# Patient Record
Sex: FEMALE | Race: OTHER
[De-identification: names, ages, dates, MRNs, and addresses within clinical notes are randomized per-mention and may not be internally consistent; named-entity substitution may affect disease eponyms.]

---

## 2019-06-24 ENCOUNTER — APPOINTMENT (OUTPATIENT)
Dept: ORTHOPEDIC SURGERY | Facility: CLINIC | Age: 84
End: 2019-06-24
Payer: MEDICARE

## 2019-06-24 VITALS
SYSTOLIC BLOOD PRESSURE: 150 MMHG | DIASTOLIC BLOOD PRESSURE: 88 MMHG | HEART RATE: 77 BPM | HEIGHT: 63 IN | TEMPERATURE: 98.2 F

## 2019-06-24 DIAGNOSIS — Z86.39 PERSONAL HISTORY OF OTHER ENDOCRINE, NUTRITIONAL AND METABOLIC DISEASE: ICD-10-CM

## 2019-06-24 DIAGNOSIS — Z78.9 OTHER SPECIFIED HEALTH STATUS: ICD-10-CM

## 2019-06-24 DIAGNOSIS — Z86.79 PERSONAL HISTORY OF OTHER DISEASES OF THE CIRCULATORY SYSTEM: ICD-10-CM

## 2019-06-24 DIAGNOSIS — Z87.09 PERSONAL HISTORY OF OTHER DISEASES OF THE RESPIRATORY SYSTEM: ICD-10-CM

## 2019-06-24 DIAGNOSIS — M19.012 PRIMARY OSTEOARTHRITIS, LEFT SHOULDER: ICD-10-CM

## 2019-06-24 PROCEDURE — 99203 OFFICE O/P NEW LOW 30 MIN: CPT | Mod: 25

## 2019-06-24 PROCEDURE — 73030 X-RAY EXAM OF SHOULDER: CPT | Mod: LT

## 2019-06-24 PROCEDURE — 20610 DRAIN/INJ JOINT/BURSA W/O US: CPT | Mod: LT

## 2019-06-27 NOTE — PROCEDURE
[de-identified] : Consent: \par At this time, I have recommended an injection to the left shoulder.  The risks and benefits of the procedure were discussed with the patient in detail.  Upon verbal consent of the patient, we proceeded with the injection as noted below.  \par \par Procedure:  \par After a sterile prep, the patient underwent an injection of 9 cc of 1% Lidocaine without epinephrine and 1 cc of Kenalog into the left shoulder.  The patient tolerated the procedure well.  There were no complications.  \par

## 2019-06-27 NOTE — PHYSICAL EXAM
[Normal] : Gait: normal [de-identified] : Right Shoulder:\par Range of Motion in Degrees:  	\par 	                                  Claimant:	Normal:	\par Abduction (Active)	                  180	               180 degrees	\par Abduction (Passive)	  180	               180 degrees	\par Forward elevation (Active):	  180	               180 degrees	\par Forward elevation (Passive):	  180	               180 degrees	\par External rotation (Active):	   45	               45 degrees	\par External rotation (Passive):	   45	               45 degrees	\par Internal rotation (Active):	   L-1	               L-1	\par Internal rotation (Passive):	   L-1	               L-1	\par \par Diffuse crepitations and tenderness throughout range of motion.  Pain and swelling throughout range of motion, more significant at extremes.  No motor weakness to internal rotation, external rotation or abduction in the scapular plane.  Negative crank test.  Negative O’Dru’s test.  Negative Speed’s test.  Negative Yergason’s test.  Negative cross arm test.  No tenderness to palpation at the AC joint. Negative Hawkin’s sign.  Negative Neer’s sign.  Negative apprehension. Negative sulcus sign.  No gross neurological or vascular deficits distally.  Skin is intact.  No rashes, scars or lesions.  2+ radial and ulnar pulses. No extra-articular swelling or tenderness.\par  \par Left Shoulder: \par Range of Motion in Degrees:  	\par 	                                  Claimant:	Normal:	\par Abduction (Active)	                   80	               180 degrees	\par Abduction (Passive)	   80	               180 degrees	\par Forward elevation (Active):	   80	               180 degrees	\par Forward elevation (Passive):	   80	               180 degrees	\par External rotation (Active):	   30	               45 degrees	\par External rotation (Passive):	   30	               45 degrees	\par Internal rotation (Active):	 To the side                  L-1	\par Internal rotation (Passive):	 To the side                  L-1	\par \par Diffuse crepitations and tenderness throughout range of motion.  Pain and swelling throughout range of motion, more significant at extremes.  No motor weakness to internal rotation, external rotation or abduction in the scapular plane.  Negative crank test.  Negative O’Dru’s test.  Negative Speed’s test.  Negative Yergason’s test.  Negative cross arm test.  No tenderness to palpation at the AC joint.  Positive Hawkin’s sign.  Positive Neer’s sign.  Negative apprehension. Negative sulcus sign.  No gross neurological or vascular deficits distally.  Skin is intact.  No rashes, scars or lesions.  2+ radial and ulnar pulses. 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

## 2019-06-27 NOTE — DISCUSSION/SUMMARY
[de-identified] : At this time, due to osteoarthritis of left shoulder, I recommended ice and elevation.  She will be reassessed in three to four weeks.  We will start her on therapy and we will discuss the potential for viscosupplementation.

## 2019-06-27 NOTE — CONSULT LETTER
[Consult Letter:] : I had the pleasure of evaluating your patient, [unfilled]. [Dear  ___] : Dear  [unfilled], [Consult Closing:] : Thank you very much for allowing me to participate in the care of this patient.  If you have any questions, please do not hesitate to contact me. [Please see my note below.] : Please see my note below. [Sincerely,] : Sincerely, [FreeTextEntry3] : Mayito Kulkarni III, MD\par [facility]

## 2019-06-27 NOTE — ADDENDUM
[FreeTextEntry1] : This note was written by Flor Christianson on 06/27/2019 acting as a scribe for JOEY GONZALEZ

## 2019-06-27 NOTE — HISTORY OF PRESENT ILLNESS
[4] : the relief from medicine is 4/10 [8] : the ailment interference is 8/10 [2] : the ailment interference is 2/10 [10  (interferes completely)] : the ailment interference is10/10 (interferes completely) [de-identified] : The patient comes in today with complaints of pain her left shoulder.  She states it has been going on for quite a long time, getting worse recently.  The patient states the onset/injury occurred on 1/15/19. The patient states the pain is constant.  The patient describes the pain as sharp.  The patient notes nothing makes her symptoms better, while lifting the shoulder makes her symptoms worse.  The patient indicates pain is at a level of 8 on a pain scale of 0-10.  [] : No [de-identified] : Ibuprofen

## 2020-06-22 ENCOUNTER — APPOINTMENT (OUTPATIENT)
Dept: ORTHOPEDIC SURGERY | Facility: CLINIC | Age: 85
End: 2020-06-22
Payer: MEDICARE

## 2020-06-22 VITALS
HEIGHT: 62 IN | SYSTOLIC BLOOD PRESSURE: 152 MMHG | BODY MASS INDEX: 34.04 KG/M2 | HEART RATE: 80 BPM | WEIGHT: 185 LBS | TEMPERATURE: 97.9 F | DIASTOLIC BLOOD PRESSURE: 82 MMHG

## 2020-06-22 DIAGNOSIS — M70.61 TROCHANTERIC BURSITIS, RIGHT HIP: ICD-10-CM

## 2020-06-22 PROCEDURE — 99214 OFFICE O/P EST MOD 30 MIN: CPT | Mod: 25

## 2020-06-22 PROCEDURE — 73502 X-RAY EXAM HIP UNI 2-3 VIEWS: CPT | Mod: RT

## 2020-06-22 PROCEDURE — 20610 DRAIN/INJ JOINT/BURSA W/O US: CPT | Mod: RT

## 2020-06-23 NOTE — PHYSICAL EXAM
[de-identified] : Appearance: Well-developed, well-nourished female  in no acute distress.  [de-identified] : Left hip:\par Hip: Range of Motion in Degrees:\par 	                                 Claimant:	   Normal:	\par Flexion (Active) 	                 120 	   120-degrees	\par Flexion (Passive)	                 120	   120-degrees	\par Extension (Active)	                 -30	   -30-degrees	\par Extension (Passive)	 -30	   -30-degrees	\par Abduction (Active)	                 45-50	   56-57-lofnzpz	\par Abduction (Passive)	 45-50	   09-35-lrnyhlh	\par Adduction (Active)  	 20-30	   07-41-rqnoqwc	\par Adduction (Passive)	 20-30	   36-92-xvzowvb	\par Internal Rotation (Active) 	 35	   35-degrees	\par Internal Rotation (Passive)	 35	   35-degrees	\par External Rotation (Active)	 45	   45-degrees	\par External Rotation (Passive)	 45	   45-degrees	\par \par No tenderness with internal or external rotation or axial load.  No tenderness to palpation over the greater trochanter.  Negative Trendelenburg.  No tenderness with resisted abduction.  No weakness to flexion, extension, abduction or adduction.  No evidence of instability.  No motor or sensory deficits.  2+ DP and PT pulses.  Skin is intact.  No scars, rashes or lesions.  \par  \par Right hip:\par Hip: Range of Motion in Degrees:\par 	                                 Claimant:	          Normal:	\par Flexion (Active) 	                 120 	          120-degrees	\par Flexion (Passive)	                 120	          120-degrees	\par Extension (Active)	                 -30	          -30-degrees	\par Extension (Passive)	 -30	          -30-degrees	\par Abduction (Active)	                45-50	          86-54-nhbagfh	\par Abduction (Passive)	45-50	          17-82-zhjzvtg	\par Adduction (Active)                	20-30	          33-29-wggqfjx	\par Adduction (Passive)	20-30	          35-81-aeezjsn	\par Internal Rotation (Active) 	35	          35-degrees	\par Internal Rotation (Passive)	35	          35-degrees	\par External Rotation (Active)	45	          45-degrees	\par External Rotation (Passive)	45	          45-degrees	\par \par No tenderness with internal or external rotation or axial load.  Point tenderness over the greater trochanter with pain with resisted abduction.  Negative Trendelenburg.  No weakness to flexion, extension, abduction or adduction.  No evidence of instability.  No motor or sensory deficits.  2+ DP and PT pulses.  Skin is intact.  No scars, rashes or lesions.  \par  \par  [de-identified] : Gait: Slightly antalgic to antalgic gait.  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

## 2020-06-23 NOTE — CONSULT LETTER
[Dear  ___] : Dear  [unfilled], [Referral Letter:] : I am referring [unfilled] to you for further evaluation.  My most recent evaluation follows. [Referral Closing:] : Thank you very much for seeing this patient.  If you have any questions, please do not hesitate to contact me. [FreeTextEntry3] : Mayito Kulkarni III, MD \par [facility]

## 2020-06-23 NOTE — HISTORY OF PRESENT ILLNESS
[de-identified] : Shirley comes in today with complaints of pain, she states, to her right hip, but she points to her low back and notes pain radiating down her leg, but she does also point to the lateral aspect of her thigh as the source of her pain.

## 2020-06-23 NOTE — PROCEDURE
[de-identified] : Consent: \par At this time, I have recommended an injection to the right hip.  The risks and benefits of the procedure were discussed with the patient in detail.  Upon verbal consent of the patient, we proceeded with the injection as noted below.  \par \par Procedure:  \par After a sterile prep, the patient underwent an injection of 9 cc of 1% Lidocaine without epinephrine and 1 cc of Kenalog into the right hip.  The patient tolerated the procedure well.  There were no complications.  \par \par

## 2020-06-23 NOTE — ADDENDUM
[FreeTextEntry1] : This note was written by Cecy Beck on 06/23/2020 acting as scribe for Mayito Kulkarni III, MD

## 2020-06-23 NOTE — DISCUSSION/SUMMARY
[de-identified] : The patient presents with trochanteric bursitis, right hip and possible radiculopathy.  At this time I recommend ice and elevation, start her on home therapy.  I am going to refer her for a spine evaluation.

## 2021-06-17 ENCOUNTER — INPATIENT (INPATIENT)
Facility: HOSPITAL | Age: 86
LOS: 3 days | Discharge: NURSING, REHAB OR LONG-TERM CARE | DRG: 481 | End: 2021-06-21
Attending: ORTHOPAEDIC SURGERY | Admitting: INTERNAL MEDICINE
Payer: MEDICARE

## 2021-06-17 VITALS
HEART RATE: 75 BPM | RESPIRATION RATE: 18 BRPM | OXYGEN SATURATION: 95 % | HEIGHT: 62 IN | DIASTOLIC BLOOD PRESSURE: 77 MMHG | SYSTOLIC BLOOD PRESSURE: 144 MMHG

## 2021-06-17 DIAGNOSIS — Z96.659 PRESENCE OF UNSPECIFIED ARTIFICIAL KNEE JOINT: Chronic | ICD-10-CM

## 2021-06-17 LAB
ANION GAP SERPL CALC-SCNC: 6 MMOL/L — SIGNIFICANT CHANGE UP (ref 5–17)
BASOPHILS # BLD AUTO: 0.03 K/UL — SIGNIFICANT CHANGE UP (ref 0–0.2)
BASOPHILS NFR BLD AUTO: 0.2 % — SIGNIFICANT CHANGE UP (ref 0–2)
BUN SERPL-MCNC: 20 MG/DL — SIGNIFICANT CHANGE UP (ref 7–23)
CALCIUM SERPL-MCNC: 8.5 MG/DL — SIGNIFICANT CHANGE UP (ref 8.5–10.1)
CHLORIDE SERPL-SCNC: 102 MMOL/L — SIGNIFICANT CHANGE UP (ref 96–108)
CO2 SERPL-SCNC: 23 MMOL/L — SIGNIFICANT CHANGE UP (ref 22–31)
CREAT SERPL-MCNC: 0.6 MG/DL — SIGNIFICANT CHANGE UP (ref 0.5–1.3)
EOSINOPHIL # BLD AUTO: 0.01 K/UL — SIGNIFICANT CHANGE UP (ref 0–0.5)
EOSINOPHIL NFR BLD AUTO: 0.1 % — SIGNIFICANT CHANGE UP (ref 0–6)
GLUCOSE SERPL-MCNC: 131 MG/DL — HIGH (ref 70–99)
HCT VFR BLD CALC: 39.7 % — SIGNIFICANT CHANGE UP (ref 34.5–45)
HGB BLD-MCNC: 13.2 G/DL — SIGNIFICANT CHANGE UP (ref 11.5–15.5)
IMM GRANULOCYTES NFR BLD AUTO: 0.5 % — SIGNIFICANT CHANGE UP (ref 0–1.5)
LYMPHOCYTES # BLD AUTO: 1.44 K/UL — SIGNIFICANT CHANGE UP (ref 1–3.3)
LYMPHOCYTES # BLD AUTO: 9.4 % — LOW (ref 13–44)
MCHC RBC-ENTMCNC: 29.2 PG — SIGNIFICANT CHANGE UP (ref 27–34)
MCHC RBC-ENTMCNC: 33.2 GM/DL — SIGNIFICANT CHANGE UP (ref 32–36)
MCV RBC AUTO: 87.8 FL — SIGNIFICANT CHANGE UP (ref 80–100)
MONOCYTES # BLD AUTO: 1.25 K/UL — HIGH (ref 0–0.9)
MONOCYTES NFR BLD AUTO: 8.2 % — SIGNIFICANT CHANGE UP (ref 2–14)
NEUTROPHILS # BLD AUTO: 12.48 K/UL — HIGH (ref 1.8–7.4)
NEUTROPHILS NFR BLD AUTO: 81.6 % — HIGH (ref 43–77)
PLATELET # BLD AUTO: 256 K/UL — SIGNIFICANT CHANGE UP (ref 150–400)
POTASSIUM SERPL-MCNC: 3.4 MMOL/L — LOW (ref 3.5–5.3)
POTASSIUM SERPL-SCNC: 3.4 MMOL/L — LOW (ref 3.5–5.3)
RBC # BLD: 4.52 M/UL — SIGNIFICANT CHANGE UP (ref 3.8–5.2)
RBC # FLD: 13.4 % — SIGNIFICANT CHANGE UP (ref 10.3–14.5)
SODIUM SERPL-SCNC: 131 MMOL/L — LOW (ref 135–145)
WBC # BLD: 15.29 K/UL — HIGH (ref 3.8–10.5)
WBC # FLD AUTO: 15.29 K/UL — HIGH (ref 3.8–10.5)

## 2021-06-17 PROCEDURE — 99285 EMERGENCY DEPT VISIT HI MDM: CPT

## 2021-06-17 RX ORDER — MORPHINE SULFATE 50 MG/1
4 CAPSULE, EXTENDED RELEASE ORAL ONCE
Refills: 0 | Status: DISCONTINUED | OUTPATIENT
Start: 2021-06-17 | End: 2021-06-17

## 2021-06-17 RX ORDER — ONDANSETRON 8 MG/1
4 TABLET, FILM COATED ORAL ONCE
Refills: 0 | Status: COMPLETED | OUTPATIENT
Start: 2021-06-17 | End: 2021-06-17

## 2021-06-17 RX ORDER — SODIUM CHLORIDE 9 MG/ML
500 INJECTION INTRAMUSCULAR; INTRAVENOUS; SUBCUTANEOUS ONCE
Refills: 0 | Status: COMPLETED | OUTPATIENT
Start: 2021-06-17 | End: 2021-06-17

## 2021-06-17 RX ADMIN — SODIUM CHLORIDE 1000 MILLILITER(S): 9 INJECTION INTRAMUSCULAR; INTRAVENOUS; SUBCUTANEOUS at 23:38

## 2021-06-17 RX ADMIN — MORPHINE SULFATE 4 MILLIGRAM(S): 50 CAPSULE, EXTENDED RELEASE ORAL at 23:38

## 2021-06-17 RX ADMIN — ONDANSETRON 4 MILLIGRAM(S): 8 TABLET, FILM COATED ORAL at 23:38

## 2021-06-17 NOTE — ED ADULT TRIAGE NOTE - CHIEF COMPLAINT QUOTE
pt biba s/p unwitnessed fall. Pt fell at approximately 3pm, found immediately by son who heard pt fall. Denies headstrike, denies LOC. Accompanied by daughter at bedside. Endorses left hip pain. Unable to obtain oral temp

## 2021-06-17 NOTE — ED ADULT NURSE NOTE - PRO INTERPRETER NEED 2
[facility]  [address] [place] [address]  Phone:  [phone]  Fax:  [phone]                                    Breanna Vidal   MRN: [id number]    Department:  [facility]   Date of Visit:  2/17/2020           After Visit Summary Signature Page    I have received my discharge instructions, and my questions have been answered. I have discussed any challenges I see with this plan with the nurse or doctor.    ..........................................................................................................................................  Patient/Patient Representative Signature      ..........................................................................................................................................  Patient Representative Print Name and Relationship to Patient    ..................................................               ................................................  Date                                   Time    ..........................................................................................................................................  Reviewed by Signature/Title    ...................................................              ..............................................  Date                                               Time          22EPIC Rev 08/18       
Divehi

## 2021-06-17 NOTE — ED PROVIDER NOTE - CARE PLAN
Principal Discharge DX:	Closed displaced intertrochanteric fracture of left femur, initial encounter

## 2021-06-17 NOTE — ED PROVIDER NOTE - CLINICAL SUMMARY MEDICAL DECISION MAKING FREE TEXT BOX
Pt p/w severe left hip pain and TTP s/p mechanical fall concerning for hip fracture.  Will get x-ray of the left hip/femur to evaluate fracture, pain control and admit to medicine with ortho consult

## 2021-06-17 NOTE — ED ADULT NURSE NOTE - OBJECTIVE STATEMENT
Pt biba s/p unwitnessed fall, c/o left hip pain. Pt fell at approximately  at 3pm, found immediately by son who heard pt fall. Denies head strike, denies LOC, denies blood thinners.  Accompanied by daughter at bedside. Patient give verbal permission for her daughter to translate for her. Pt awake alert and oriented to self and place. Pt baseline dementia. Pt denies Cp, SOB, dizziness, headache. No other complains at this time.

## 2021-06-17 NOTE — ED PROVIDER NOTE - OBJECTIVE STATEMENT
89 y/o F with h/o HTN and HLD not on AC BIBEMS for fall at about 1500 today while attempting to sit back on a chair that had a pillow on top of it.  Pt notes the pillow slipped off the chair and she fell onto her left hip but denies any head injury or LOC.  Pt has some chronic lumbar compression fractures and states her back pain is the same after the fall.  Pt was unable to bear weight and was assisted by her son to get back into the chair.  She needed full assistance by EMS to get onto the stretcher.  Pt given Morphine by EMS with some relief of pain.  Pt speaks Turkish and her daughter provided the translation at her request.

## 2021-06-18 ENCOUNTER — TRANSCRIPTION ENCOUNTER (OUTPATIENT)
Age: 86
End: 2021-06-18

## 2021-06-18 DIAGNOSIS — S72.142A DISPLACED INTERTROCHANTERIC FRACTURE OF LEFT FEMUR, INITIAL ENCOUNTER FOR CLOSED FRACTURE: ICD-10-CM

## 2021-06-18 LAB
24R-OH-CALCIDIOL SERPL-MCNC: 26.9 NG/ML — LOW (ref 30–80)
ANION GAP SERPL CALC-SCNC: 2 MMOL/L — LOW (ref 5–17)
ANION GAP SERPL CALC-SCNC: 7 MMOL/L — SIGNIFICANT CHANGE UP (ref 5–17)
APTT BLD: 24.7 SEC — LOW (ref 27.5–35.5)
BUN SERPL-MCNC: 18 MG/DL — SIGNIFICANT CHANGE UP (ref 7–23)
BUN SERPL-MCNC: 22 MG/DL — SIGNIFICANT CHANGE UP (ref 7–23)
CALCIUM SERPL-MCNC: 8 MG/DL — LOW (ref 8.5–10.1)
CALCIUM SERPL-MCNC: 8 MG/DL — LOW (ref 8.5–10.1)
CHLORIDE SERPL-SCNC: 103 MMOL/L — SIGNIFICANT CHANGE UP (ref 96–108)
CHLORIDE SERPL-SCNC: 105 MMOL/L — SIGNIFICANT CHANGE UP (ref 96–108)
CO2 SERPL-SCNC: 22 MMOL/L — SIGNIFICANT CHANGE UP (ref 22–31)
CO2 SERPL-SCNC: 28 MMOL/L — SIGNIFICANT CHANGE UP (ref 22–31)
COVID-19 SPIKE DOMAIN AB INTERP: POSITIVE
COVID-19 SPIKE DOMAIN ANTIBODY RESULT: 226 U/ML — HIGH
CREAT SERPL-MCNC: 0.53 MG/DL — SIGNIFICANT CHANGE UP (ref 0.5–1.3)
CREAT SERPL-MCNC: 0.58 MG/DL — SIGNIFICANT CHANGE UP (ref 0.5–1.3)
GLUCOSE SERPL-MCNC: 111 MG/DL — HIGH (ref 70–99)
GLUCOSE SERPL-MCNC: 121 MG/DL — HIGH (ref 70–99)
HCT VFR BLD CALC: 31.7 % — LOW (ref 34.5–45)
HCT VFR BLD CALC: 33.8 % — LOW (ref 34.5–45)
HGB BLD-MCNC: 10.4 G/DL — LOW (ref 11.5–15.5)
HGB BLD-MCNC: 11.1 G/DL — LOW (ref 11.5–15.5)
INR BLD: 1.13 RATIO — SIGNIFICANT CHANGE UP (ref 0.88–1.16)
MCHC RBC-ENTMCNC: 29.4 PG — SIGNIFICANT CHANGE UP (ref 27–34)
MCHC RBC-ENTMCNC: 29.8 PG — SIGNIFICANT CHANGE UP (ref 27–34)
MCHC RBC-ENTMCNC: 32.8 GM/DL — SIGNIFICANT CHANGE UP (ref 32–36)
MCHC RBC-ENTMCNC: 32.8 GM/DL — SIGNIFICANT CHANGE UP (ref 32–36)
MCV RBC AUTO: 89.7 FL — SIGNIFICANT CHANGE UP (ref 80–100)
MCV RBC AUTO: 90.8 FL — SIGNIFICANT CHANGE UP (ref 80–100)
PLATELET # BLD AUTO: 207 K/UL — SIGNIFICANT CHANGE UP (ref 150–400)
PLATELET # BLD AUTO: 229 K/UL — SIGNIFICANT CHANGE UP (ref 150–400)
POTASSIUM SERPL-MCNC: 3.9 MMOL/L — SIGNIFICANT CHANGE UP (ref 3.5–5.3)
POTASSIUM SERPL-MCNC: 4.4 MMOL/L — SIGNIFICANT CHANGE UP (ref 3.5–5.3)
POTASSIUM SERPL-SCNC: 3.9 MMOL/L — SIGNIFICANT CHANGE UP (ref 3.5–5.3)
POTASSIUM SERPL-SCNC: 4.4 MMOL/L — SIGNIFICANT CHANGE UP (ref 3.5–5.3)
PROTHROM AB SERPL-ACNC: 13 SEC — SIGNIFICANT CHANGE UP (ref 10.6–13.6)
RBC # BLD: 3.49 M/UL — LOW (ref 3.8–5.2)
RBC # BLD: 3.77 M/UL — LOW (ref 3.8–5.2)
RBC # FLD: 13.5 % — SIGNIFICANT CHANGE UP (ref 10.3–14.5)
RBC # FLD: 13.6 % — SIGNIFICANT CHANGE UP (ref 10.3–14.5)
SARS-COV-2 IGG+IGM SERPL QL IA: 226 U/ML — HIGH
SARS-COV-2 IGG+IGM SERPL QL IA: POSITIVE
SARS-COV-2 RNA SPEC QL NAA+PROBE: SIGNIFICANT CHANGE UP
SODIUM SERPL-SCNC: 133 MMOL/L — LOW (ref 135–145)
SODIUM SERPL-SCNC: 134 MMOL/L — LOW (ref 135–145)
WBC # BLD: 10.93 K/UL — HIGH (ref 3.8–10.5)
WBC # BLD: 14.94 K/UL — HIGH (ref 3.8–10.5)
WBC # FLD AUTO: 10.93 K/UL — HIGH (ref 3.8–10.5)
WBC # FLD AUTO: 14.94 K/UL — HIGH (ref 3.8–10.5)

## 2021-06-18 PROCEDURE — U0003: CPT

## 2021-06-18 PROCEDURE — 97162 PT EVAL MOD COMPLEX 30 MIN: CPT | Mod: GP

## 2021-06-18 PROCEDURE — 97530 THERAPEUTIC ACTIVITIES: CPT | Mod: GP

## 2021-06-18 PROCEDURE — C1713: CPT

## 2021-06-18 PROCEDURE — 85730 THROMBOPLASTIN TIME PARTIAL: CPT

## 2021-06-18 PROCEDURE — 86900 BLOOD TYPING SEROLOGIC ABO: CPT

## 2021-06-18 PROCEDURE — 99223 1ST HOSP IP/OBS HIGH 75: CPT

## 2021-06-18 PROCEDURE — 87635 SARS-COV-2 COVID-19 AMP PRB: CPT

## 2021-06-18 PROCEDURE — 36415 COLL VENOUS BLD VENIPUNCTURE: CPT

## 2021-06-18 PROCEDURE — 93010 ELECTROCARDIOGRAM REPORT: CPT

## 2021-06-18 PROCEDURE — 85027 COMPLETE CBC AUTOMATED: CPT

## 2021-06-18 PROCEDURE — 86901 BLOOD TYPING SEROLOGIC RH(D): CPT

## 2021-06-18 PROCEDURE — 73552 X-RAY EXAM OF FEMUR 2/>: CPT | Mod: 26,LT

## 2021-06-18 PROCEDURE — 85610 PROTHROMBIN TIME: CPT

## 2021-06-18 PROCEDURE — 73562 X-RAY EXAM OF KNEE 3: CPT | Mod: 26,LT

## 2021-06-18 PROCEDURE — 74176 CT ABD & PELVIS W/O CONTRAST: CPT | Mod: 26,MA

## 2021-06-18 PROCEDURE — C1776: CPT

## 2021-06-18 PROCEDURE — 73502 X-RAY EXAM HIP UNI 2-3 VIEWS: CPT | Mod: 26,LT

## 2021-06-18 PROCEDURE — 71045 X-RAY EXAM CHEST 1 VIEW: CPT | Mod: 26

## 2021-06-18 PROCEDURE — 80048 BASIC METABOLIC PNL TOTAL CA: CPT

## 2021-06-18 PROCEDURE — 94640 AIRWAY INHALATION TREATMENT: CPT

## 2021-06-18 PROCEDURE — 97116 GAIT TRAINING THERAPY: CPT | Mod: GP

## 2021-06-18 PROCEDURE — 93005 ELECTROCARDIOGRAM TRACING: CPT

## 2021-06-18 PROCEDURE — 86769 SARS-COV-2 COVID-19 ANTIBODY: CPT

## 2021-06-18 PROCEDURE — 76000 FLUOROSCOPY <1 HR PHYS/QHP: CPT

## 2021-06-18 PROCEDURE — 82306 VITAMIN D 25 HYDROXY: CPT

## 2021-06-18 PROCEDURE — 86850 RBC ANTIBODY SCREEN: CPT

## 2021-06-18 PROCEDURE — C1769: CPT

## 2021-06-18 PROCEDURE — 82040 ASSAY OF SERUM ALBUMIN: CPT

## 2021-06-18 RX ORDER — ATORVASTATIN CALCIUM 80 MG/1
10 TABLET, FILM COATED ORAL AT BEDTIME
Refills: 0 | Status: DISCONTINUED | OUTPATIENT
Start: 2021-06-18 | End: 2021-06-21

## 2021-06-18 RX ORDER — BENZOCAINE AND MENTHOL 5; 1 G/100ML; G/100ML
1 LIQUID ORAL
Refills: 0 | Status: DISCONTINUED | OUTPATIENT
Start: 2021-06-18 | End: 2021-06-21

## 2021-06-18 RX ORDER — TRAMADOL HYDROCHLORIDE 50 MG/1
50 TABLET ORAL EVERY 6 HOURS
Refills: 0 | Status: DISCONTINUED | OUTPATIENT
Start: 2021-06-18 | End: 2021-06-21

## 2021-06-18 RX ORDER — TRAMADOL HYDROCHLORIDE 50 MG/1
25 TABLET ORAL EVERY 6 HOURS
Refills: 0 | Status: DISCONTINUED | OUTPATIENT
Start: 2021-06-18 | End: 2021-06-21

## 2021-06-18 RX ORDER — PANTOPRAZOLE SODIUM 20 MG/1
40 TABLET, DELAYED RELEASE ORAL DAILY
Refills: 0 | Status: DISCONTINUED | OUTPATIENT
Start: 2021-06-18 | End: 2021-06-21

## 2021-06-18 RX ORDER — MORPHINE SULFATE 50 MG/1
2 CAPSULE, EXTENDED RELEASE ORAL EVERY 4 HOURS
Refills: 0 | Status: DISCONTINUED | OUTPATIENT
Start: 2021-06-18 | End: 2021-06-21

## 2021-06-18 RX ORDER — ENOXAPARIN SODIUM 100 MG/ML
40 INJECTION SUBCUTANEOUS EVERY 24 HOURS
Refills: 0 | Status: DISCONTINUED | OUTPATIENT
Start: 2021-06-19 | End: 2021-06-21

## 2021-06-18 RX ORDER — CEFAZOLIN SODIUM 1 G
2000 VIAL (EA) INJECTION EVERY 8 HOURS
Refills: 0 | Status: COMPLETED | OUTPATIENT
Start: 2021-06-18 | End: 2021-06-19

## 2021-06-18 RX ORDER — ASCORBIC ACID 60 MG
500 TABLET,CHEWABLE ORAL
Refills: 0 | Status: DISCONTINUED | OUTPATIENT
Start: 2021-06-18 | End: 2021-06-21

## 2021-06-18 RX ORDER — ACETAMINOPHEN 500 MG
975 TABLET ORAL EVERY 8 HOURS
Refills: 0 | Status: DISCONTINUED | OUTPATIENT
Start: 2021-06-18 | End: 2021-06-21

## 2021-06-18 RX ORDER — OXYCODONE HYDROCHLORIDE 5 MG/1
5 TABLET ORAL ONCE
Refills: 0 | Status: DISCONTINUED | OUTPATIENT
Start: 2021-06-18 | End: 2021-06-18

## 2021-06-18 RX ORDER — ENOXAPARIN SODIUM 100 MG/ML
40 INJECTION SUBCUTANEOUS
Qty: 30 | Refills: 0
Start: 2021-06-18 | End: 2021-07-17

## 2021-06-18 RX ORDER — ONDANSETRON 8 MG/1
4 TABLET, FILM COATED ORAL EVERY 6 HOURS
Refills: 0 | Status: DISCONTINUED | OUTPATIENT
Start: 2021-06-18 | End: 2021-06-21

## 2021-06-18 RX ORDER — BUDESONIDE AND FORMOTEROL FUMARATE DIHYDRATE 160; 4.5 UG/1; UG/1
2 AEROSOL RESPIRATORY (INHALATION)
Refills: 0 | Status: DISCONTINUED | OUTPATIENT
Start: 2021-06-18 | End: 2021-06-21

## 2021-06-18 RX ORDER — FENTANYL CITRATE 50 UG/ML
25 INJECTION INTRAVENOUS
Refills: 0 | Status: DISCONTINUED | OUTPATIENT
Start: 2021-06-18 | End: 2021-06-18

## 2021-06-18 RX ORDER — SODIUM CHLORIDE 9 MG/ML
1000 INJECTION, SOLUTION INTRAVENOUS
Refills: 0 | Status: DISCONTINUED | OUTPATIENT
Start: 2021-06-18 | End: 2021-06-18

## 2021-06-18 RX ORDER — SODIUM CHLORIDE 9 MG/ML
1000 INJECTION, SOLUTION INTRAVENOUS
Refills: 0 | Status: DISCONTINUED | OUTPATIENT
Start: 2021-06-18 | End: 2021-06-21

## 2021-06-18 RX ORDER — FLUTICASONE PROPIONATE 50 MCG
1 SPRAY, SUSPENSION NASAL
Refills: 0 | Status: DISCONTINUED | OUTPATIENT
Start: 2021-06-18 | End: 2021-06-21

## 2021-06-18 RX ORDER — AMLODIPINE BESYLATE 2.5 MG/1
5 TABLET ORAL DAILY
Refills: 0 | Status: DISCONTINUED | OUTPATIENT
Start: 2021-06-18 | End: 2021-06-21

## 2021-06-18 RX ORDER — ACETAMINOPHEN 500 MG
650 TABLET ORAL EVERY 6 HOURS
Refills: 0 | Status: DISCONTINUED | OUTPATIENT
Start: 2021-06-18 | End: 2021-06-21

## 2021-06-18 RX ORDER — MAGNESIUM HYDROXIDE 400 MG/1
30 TABLET, CHEWABLE ORAL DAILY
Refills: 0 | Status: DISCONTINUED | OUTPATIENT
Start: 2021-06-18 | End: 2021-06-21

## 2021-06-18 RX ORDER — FAMOTIDINE 10 MG/ML
20 INJECTION INTRAVENOUS EVERY 12 HOURS
Refills: 0 | Status: DISCONTINUED | OUTPATIENT
Start: 2021-06-18 | End: 2021-06-18

## 2021-06-18 RX ORDER — MONTELUKAST 4 MG/1
10 TABLET, CHEWABLE ORAL AT BEDTIME
Refills: 0 | Status: DISCONTINUED | OUTPATIENT
Start: 2021-06-18 | End: 2021-06-21

## 2021-06-18 RX ORDER — ONDANSETRON 8 MG/1
4 TABLET, FILM COATED ORAL ONCE
Refills: 0 | Status: DISCONTINUED | OUTPATIENT
Start: 2021-06-18 | End: 2021-06-18

## 2021-06-18 RX ORDER — LANOLIN ALCOHOL/MO/W.PET/CERES
3 CREAM (GRAM) TOPICAL AT BEDTIME
Refills: 0 | Status: DISCONTINUED | OUTPATIENT
Start: 2021-06-18 | End: 2021-06-21

## 2021-06-18 RX ORDER — ONDANSETRON 8 MG/1
4 TABLET, FILM COATED ORAL EVERY 6 HOURS
Refills: 0 | Status: DISCONTINUED | OUTPATIENT
Start: 2021-06-18 | End: 2021-06-18

## 2021-06-18 RX ORDER — SENNA PLUS 8.6 MG/1
2 TABLET ORAL AT BEDTIME
Refills: 0 | Status: DISCONTINUED | OUTPATIENT
Start: 2021-06-18 | End: 2021-06-21

## 2021-06-18 RX ORDER — SODIUM CHLORIDE 9 MG/ML
1000 INJECTION INTRAMUSCULAR; INTRAVENOUS; SUBCUTANEOUS
Refills: 0 | Status: DISCONTINUED | OUTPATIENT
Start: 2021-06-18 | End: 2021-06-21

## 2021-06-18 RX ORDER — FOLIC ACID 0.8 MG
1 TABLET ORAL DAILY
Refills: 0 | Status: DISCONTINUED | OUTPATIENT
Start: 2021-06-18 | End: 2021-06-21

## 2021-06-18 RX ADMIN — Medication 975 MILLIGRAM(S): at 13:07

## 2021-06-18 RX ADMIN — OXYCODONE HYDROCHLORIDE 5 MILLIGRAM(S): 5 TABLET ORAL at 18:45

## 2021-06-18 RX ADMIN — SENNA PLUS 2 TABLET(S): 8.6 TABLET ORAL at 22:00

## 2021-06-18 RX ADMIN — ATORVASTATIN CALCIUM 10 MILLIGRAM(S): 80 TABLET, FILM COATED ORAL at 22:00

## 2021-06-18 RX ADMIN — Medication 975 MILLIGRAM(S): at 22:01

## 2021-06-18 RX ADMIN — SODIUM CHLORIDE 75 MILLILITER(S): 9 INJECTION INTRAMUSCULAR; INTRAVENOUS; SUBCUTANEOUS at 13:06

## 2021-06-18 RX ADMIN — MORPHINE SULFATE 2 MILLIGRAM(S): 50 CAPSULE, EXTENDED RELEASE ORAL at 06:22

## 2021-06-18 RX ADMIN — PANTOPRAZOLE SODIUM 40 MILLIGRAM(S): 20 TABLET, DELAYED RELEASE ORAL at 10:57

## 2021-06-18 RX ADMIN — MORPHINE SULFATE 2 MILLIGRAM(S): 50 CAPSULE, EXTENDED RELEASE ORAL at 06:42

## 2021-06-18 RX ADMIN — MONTELUKAST 10 MILLIGRAM(S): 4 TABLET, CHEWABLE ORAL at 22:00

## 2021-06-18 RX ADMIN — Medication 100 MILLIGRAM(S): at 22:00

## 2021-06-18 RX ADMIN — Medication 500 MILLIGRAM(S): at 22:00

## 2021-06-18 RX ADMIN — Medication 975 MILLIGRAM(S): at 22:00

## 2021-06-18 RX ADMIN — Medication 975 MILLIGRAM(S): at 13:06

## 2021-06-18 RX ADMIN — AMLODIPINE BESYLATE 5 MILLIGRAM(S): 2.5 TABLET ORAL at 10:57

## 2021-06-18 RX ADMIN — OXYCODONE HYDROCHLORIDE 5 MILLIGRAM(S): 5 TABLET ORAL at 18:51

## 2021-06-18 RX ADMIN — BUDESONIDE AND FORMOTEROL FUMARATE DIHYDRATE 2 PUFF(S): 160; 4.5 AEROSOL RESPIRATORY (INHALATION) at 22:02

## 2021-06-18 RX ADMIN — SODIUM CHLORIDE 75 MILLILITER(S): 9 INJECTION INTRAMUSCULAR; INTRAVENOUS; SUBCUTANEOUS at 03:09

## 2021-06-18 NOTE — CONSULT NOTE ADULT - ASSESSMENT
This is a 89 year old female s/p fall sustaining left hip fracture, NPO for OR for IM nail this afternoon with Dr. Garcia. She is high risk for VTE due to age, BMI, impaired mobility, and surgery. She is low risk for bleeding.    Plan:  ::Lovenox 40 mg SQ daily x 4 weeks starting 8 hours post-operatively as long as hemostasis maintained  ::Daily CBC/BMP  ::Venodynes b/l  ::Enc ambulation per PT eval    Thank you for this consult, will continue to follow.  Dispo: Home  Script sent to pharmacy

## 2021-06-18 NOTE — H&P ADULT - NSHPLABSRESULTS_GEN_ALL_CORE
11.1   10.93 )-----------( 229      ( 18 Jun 2021 06:04 )             33.8     18 Jun 2021 06:04    133    |  103    |  22     ----------------------------<  111    4.4     |  28     |  0.53     Ca    8.0        18 Jun 2021 06:04    TPro  x      /  Alb  3.1    /  TBili  x      /  DBili  x      /  AST  x      /  ALT  x      /  AlkPhos  x      18 Jun 2021 06:04    LIVER FUNCTIONS - ( 18 Jun 2021 06:04 )  Alb: 3.1 g/dL / Pro: x     / ALK PHOS: x     / ALT: x     / AST: x     / GGT: x           PT/INR - ( 18 Jun 2021 06:04 )   PT: 13.0 sec;   INR: 1.13 ratio      PTT - ( 18 Jun 2021 06:04 )  PTT:24.7 sec

## 2021-06-18 NOTE — PATIENT PROFILE ADULT - OVER THE PAST TWO WEEKS HAVE YOU FELT DOWN, DEPRESSED OR HOPELESS?
Pt arrives via EMS from home--Pt has alzheimer's and had a peg placed which hasn't been used x9 months--pts peg is leaking and blood around area
no

## 2021-06-18 NOTE — CONSULT NOTE ADULT - SUBJECTIVE AND OBJECTIVE BOX
89y Female, Sami speaking, presents with LEFT hip pain s/p mechanical fall. Siri Rodriguez (Dgtr/HCP) [[phone]] at bedside. Daughter reports pt has some dementia and has difficulty understanding Sami translators. Pt admits to some chronic numbness in her feet. Otherwise denies numbness/tingling in the affected extremity. Denies other orthopedic injuries at this time. Patient ambulates with cane at baseline. Daughter reports pt is not on any blood thinners.    PAST MEDICAL & SURGICAL HISTORY:  Asthma    HTN (hypertension)    Dyslipidemia    S/P knee replacement  left      Home Medications:  Advair  mcg-21 mcg/inh inhalation aerosol: 2 puff(s) inhaled 2 times a day (14 Sep 2016 10:50)  Flonase 50 mcg/inh nasal spray: 1 spray(s) nasal once a day (14 Sep 2016 10:51)  Lipitor 10 mg oral tablet: 1 tab(s) orally once a day (at bedtime) (14 Sep 2016 10:49)  Norvasc 5 mg oral tablet: 1 tab(s) orally once a day (14 Sep 2016 10:49)  omeprazole 20 mg oral delayed release capsule: 1 cap(s) orally once a day (15 Sep 2016 12:11)  Singulair 10 mg oral tablet: 1 tab(s) orally once a day (14 Sep 2016 10:49)    Allergies    codeine (Unknown)    Intolerances                              13.2   15.29 )-----------( 256      ( 17 Jun 2021 23:25 )             39.7     06-17    131<L>  |  102  |  20  ----------------------------<  131<H>  3.4<L>   |  23  |  0.60    Ca    8.5      17 Jun 2021 23:25              Vital Signs Last 24 Hrs  T(C): 37 (18 Jun 2021 00:31), Max: 37 (18 Jun 2021 00:31)  T(F): 98.6 (18 Jun 2021 00:31), Max: 98.6 (18 Jun 2021 00:31)  HR: 72 (17 Jun 2021 23:37) (72 - 75)  BP: 135/67 (17 Jun 2021 23:37) (135/67 - 144/77)  BP(mean): --  RR: 17 (17 Jun 2021 23:37) (17 - 18)  SpO2: 95% (17 Jun 2021 23:37) (95% - 95%)    PHYSICAL EXAM  General: NAD, Awake and Alert    LEFT Lower Extremity:   Skin intact, surgical scar well healed over anterior knee  No ecchymosis or swelling  Shortened and externally rotated   TTP over the bony prominences of the hip  NTTP over the bony prominences of the knee/ankle/foot/toes  L2-S1 SILT  +EHL/FHL/TA/GSC  +DP pulses  Calf nontender  Compartments soft and compressible    SECONDARY EXAM: Benign, Skin intact, SILT throughout, motor grossly intact throughout, no other orthopedic injuries at this time, compartments soft and compressible    SPINE: Skin intact, no bony tenderness or step-offs appreciated throughout cervical/thoracic/lumbar/sacral spine    B/l UE: Skin intact, no erythema, ecchymosis, edema, gross deformity, NTTP over the bony prominences of the shoulder/elbow/wrist/hand, painless passive/active ROM of the shoulder/elbow/wrist/hand, C5-T1 SILT, motor grossly intact throughout axillary/musculocutaneous/radial/median/ulnar nerves, + radial pulse    RLE: Skin intact, no erythema, ecchymosis, edema, gross deformity, NTTP over the bony prominences of the hip/knee/ankle/foot, painless passive/active ROM of the hip/knee/ankle/foot, L2-S1 SILT, motor grossly intact throughout Hip Flexors/Quadriceps/Hamstrings/TA/EHL/FHL/GSC, + DP pulses, no pain with log roll, no pain on axial loading, compartments soft and compressible, calf nontender      IMAGING:  XR LEFT Hip: Displaced, shortened, and comminuted intertrochanteric fracture  XR LEFT Femur: No evidence of other fracture or dislocation  XR LEFT Knee: No evidence of other fracture or dislocation. TKA Prosthesis in place.             
HPI:  Patient is a 89y old  Female who presents with a chief complaint of S/p mechanical fall    Consulted by Dr. Garcia for VTE prophylaxis, risk stratification, and anticoagulation management.    PAST MEDICAL & SURGICAL HISTORY:  Asthma    HTN (hypertension)    Dyslipidemia    S/P knee replacement  left    Interval History:  : Patient seen at bedside pre-op. Daughter is permitted to remain at bedside due to Syriac speaking only and dialect uninterpreted via [place] . Daughter relays that he Mom lives with her and was going to sit down in chair and slipped forward as she was sitting. Denies any head trauma.   Explained my role with anticoagulation and discussed necessity for Lovenox post-op for four weeks. Daughter is a  and has no issues giving injection "As long as you go over it with me again." Other Daughter will also be teaming up to help with Mother's care at home. They do not want to take her to rehab.     BMI: 30.9    CrCl: 803    Incision Time:  Procedure End Time:  Tourniquet Time:  EBL:    Caprini VTE Risk Score:  CAPRINI SCORE  AGE RELATED RISK FACTORS                                                       MOBILITY RELATED FACTORS  [ ] Age 41-60 years                                            (1 Point)                  [ ] Bed rest /restricted mobility                      (1 Point)  [ ] Age: 61-74 years                                           (2 Points)                [ ] Plaster cast                                                   (2 Points)  [x ] Age= 75 years                                              (3 Points)                 [ ] Bed bound for more than 72 hours                   (2 Points)    DISEASE RELATED RISK FACTORS                                               GENDER SPECIFIC FACTORS  [ ] Edema in the lower extremities                       (1 Point)           [ ] Pregnancy                                                            (1 Point)  [ ] Varicose veins                                               (1 Point)                  [ ] Post-partum < 6 weeks                                      (1 Point)             [x ] BMI > 25 Kg/m2                                            (1 Point)                  [ ] Hormonal therapy or oral contraception       (1 Point)                 [ ] Sepsis (in the previous month)                        (1 Point)             [ ] History of pregnancy complications                (1Point)  [ ] Pneumonia or serious lung disease                                             [ ] Unexplained or recurrent  (=/>3), premature                                 (In the previous month)                               (1 Point)                birth with toxemia or growth-restricted infant (1 Point)  [ ] Abnormal pulmonary function test            (1 Point)                                   SURGERY RELATED RISK FACTORS  [ ] Acute myocardial infarction                       (1 Point)                  [ ]  Section                                         (1 Point)  [ ] Congestive heart failure (in the previous month) (1 Point)   [ ] Minor surgery   lasting <45 minutes       (1 Point)   [ ] Inflammatory bowel disease                             (1 Point)          [ ] Arthroscopic surgery                                  (2 Points)  [ ] Central venous access                                    (2 Points)            [ ] General surgery lasting >45 minutes      (2 Points)       [ ] Stroke (in the previous month)                  (5 Points)            [ ] Elective major lower extremity arthroplasty (5 Points)                                   [  ] Malignancy (present or past include skin melanoma                                          but exclude  basal skin cell)    (2 points)                                      TRAUMA RELATED RISK FACTORS                HEMATOLOGY RELATED FACTORS                                  [x ] Fracture of the hip, pelvis, or leg                       (5 Points)  [ ] Prior episodes of VTE                                     (3 Points)          [ ] Acute spinal cord injury (in the previous month)  (5 Points)  [ ] Positive family history for VTE                         (3 Points)       [ ] Paralysis (less than 1 month)                          (5 Points)  [ ] Prothrombin 32532 A                                      (3 Points)         [ ] Multiple Trauma (within 1month)                 (5Points)                                                                                                                                                                [ ] Factor V Leiden                                          (3 Points)                                OTHER RISK FACTORS                          [ ] Lupus anticoagulants                                     (3 Points)                     [ ] BMI > 40                          (1 Point)                                                         [ ] Anticardiolipin antibodies                                (3 Points)                 [ ] Smoking                              (1Point)                                                [ ] High homocysteine in the blood                      (3 Points)                [  ] Diabetes requiring insulin (1point)                         [ ] Other congenital or acquired thrombophilia       (3 Points)          [  ] Chemotherapy                   (1 Point)  [ ] Heparin induced thrombocytopenia                  (3 Points)             [  ] Blood Transfusion                (1 point)                                                                                                             Total Score [  9        ]                                                                                                                                                                                                                                                                                                                                                                                                                                       IMPROVE Bleeding Risk Score:3.5      Falls Risk:   High ( x )  Mod (  )  Low (  )      FAMILY HISTORY:  No pertinent family history in first degree relatives      Denies any personal or familial history of clotting or bleeding disorders.    Allergies    codeine (Unknown)    Intolerances        REVIEW OF SYSTEMS    (  )Fever	        (  )Constipation	(  )SOB				  (  )Headache   (  )Dysuria  (  )Chills	        (  )Melena	        (  )Dyspnea on exertion (  )Dizziness    (  )Polyuria  (  )Nausea      (  )Hematochezia	(  )Cough                          (  )Syncope      (  )Hematuria  (  )Vomiting   (  )Chest Pain	        (  )Wheezing			  (  )Weakness  (  )Diarrhea    (  )Palpitations	(  )Anorexia			  (x  )Myalgia       (  x ) Arthralgia    Pertinent positives in HPI and daily subjective. All other systems negative.    Vital Signs Last 24 Hrs  T(C): 36.6 (21 @ 08:50), Max: 37 (21 @ 00:31)  T(F): 97.9 (21 @ 08:50), Max: 98.6 (21 @ 00:31)  HR: 56 (21 @ 08:50) (56 - 75)  BP: 104/58 (21 @ 08:50) (104/58 - 144/77)  BP(mean): 68 (21 @ 08:50) (68 - 82)  RR: 16 (21 @ 08:50) (16 - 18)  SpO2: 95% (21 @ 08:50) (95% - 98%)      PHYSICAL EXAM:    Constitutional: Appears Well    Neurological: A& O x 3    Skin: Warm    Respiratory and Thorax: normal effort; Breath sounds: normal; No rales/wheezing/rhonchi  	  Cardiovascular: S1, S2, regular, grII/VI murmur	    Gastrointestinal: BS + x 4Q, nontender	    Genitourinary:  Bladder nondistended, nontender    Musculoskeletal:   General Right:   no muscle/joint tenderness,   normal tone, no joint swelling,   ROM: full	    General Left:   no muscle/joint tenderness,   normal tone, no joint swelling,   ROM: limited    Hip:  Left:     Lower extrems:   Right: no calf tenderness              negative faith's sign               + pedal pulses    Left:   no calf tenderness              negative faith's sign               + pedal pulses                          11.1   10.93 )-----------( 229      ( 2021 06:04 )             33.8           133<L>  |  103  |  22  ----------------------------<  111<H>  4.4   |  28  |  0.53    Ca    8.0<L>      2021 06:04    TPro  x   /  Alb  3.1<L>  /  TBili  x   /  DBili  x   /  AST  x   /  ALT  x   /  AlkPhos  x         PT/INR - ( 2021 06:04 )   PT: 13.0 sec;   INR: 1.13 ratio         PTT - ( 2021 06:04 )  PTT:24.7 sec				    MEDICATIONS  (STANDING):  acetaminophen   Tablet .. 975 milliGRAM(s) Oral every 8 hours  amLODIPine   Tablet 5 milliGRAM(s) Oral daily  atorvastatin 10 milliGRAM(s) Oral at bedtime  fluticasone propionate 50 MICROgram(s)/spray Nasal Spray 1 Spray(s) Both Nostrils two times a day  montelukast 10 milliGRAM(s) Oral at bedtime  pantoprazole    Tablet 40 milliGRAM(s) Oral daily  senna 2 Tablet(s) Oral at bedtime  sodium chloride 0.9%. 1000 milliLiter(s) IV Continuous <Continuous>    **Current DVT Prophylaxis:    LMWH                   ( post-op )  Heparin SQ           (  )  Coumadin             (  )  Xarelto                  (  )  Eliquis                   (  )  Venodynes           (x  )  Ambulation          ( x )  UFH                       (  )  ECASA                   (  )  Contraindicated  (  )

## 2021-06-18 NOTE — H&P ADULT - NSHPPHYSICALEXAM_GEN_ALL_CORE
Vital Signs Last 24 Hrs  T(C): 36.6 (18 Jun 2021 08:50), Max: 37 (18 Jun 2021 00:31)  T(F): 97.9 (18 Jun 2021 08:50), Max: 98.6 (18 Jun 2021 00:31)  HR: 56 (18 Jun 2021 08:50) (56 - 75)  BP: 104/58 (18 Jun 2021 08:50) (104/58 - 144/77)  BP(mean): 68 (18 Jun 2021 08:50) (68 - 82)  RR: 16 (18 Jun 2021 08:50) (16 - 18)  SpO2: 95% (18 Jun 2021 08:50) (95% - 98%)

## 2021-06-18 NOTE — CONSULT NOTE ADULT - ASSESSMENT
Assessment/Plan:  89y Female with LEFT intertrochanteric fracture    -Pain control as needed  -Bedrest, NWB LEFT lower extremity   -Plan for IMN of LEFT hip   -NPO, IVF while NPO  -FU preop labs  -VTE ppx: Please hold all chemical ppx for OR   -Admit to medicine  -Needs medical optimization for OR  -Needs documentation of medical clearance  -Medical management per primary team  -Will discuss with attending, and advise if plan changes

## 2021-06-18 NOTE — DISCHARGE NOTE PROVIDER - CARE PROVIDER_API CALL
Dc Garcia (DO)  Orthopaedic Surgery  [address] [place] [address]  Phone: [phone]  Fax: [phone]  Follow Up Time:

## 2021-06-18 NOTE — H&P ADULT - HISTORY OF PRESENT ILLNESS
89yo/F with PMH HTN, hyperlipidemia, dementia, asthma, chronic back pain, OA s/p prior LT TKR presented s/p mechanical fall at home. Per daughter at bedside, she was trying to sti down on a chair and missed it, fell on the floor and could not get up. Denies LOC, no head trauma. No cardiac history. Found to have. Found to have LT hip fracture

## 2021-06-18 NOTE — DISCHARGE NOTE PROVIDER - NSDCCPCAREPLAN_GEN_ALL_CORE_FT
PRINCIPAL DISCHARGE DIAGNOSIS  Diagnosis: Closed displaced intertrochanteric fracture of left femur, initial encounter  Assessment and Plan of Treatment: physical therapy at rehab.   follow up with orthopedics as advised       PRINCIPAL DISCHARGE DIAGNOSIS  Diagnosis: Closed displaced intertrochanteric fracture of left femur, initial encounter  Assessment and Plan of Treatment: physical therapy at rehab.   follow up with orthopedics as advised.  LOVENOX TO BE GIVEN UNTIL JULY 16

## 2021-06-18 NOTE — DISCHARGE NOTE PROVIDER - NSDCMRMEDTOKEN_GEN_ALL_CORE_FT
Advair  mcg-21 mcg/inh inhalation aerosol: 2 puff(s) inhaled 2 times a day  docusate sodium 100 mg oral capsule: 1 cap(s) orally 2 times a day  enoxaparin 40 mg/0.4 mL injectable solution: Inject syringe into abdomen once daily as directed by [facility] [phone] MDD:40mg  Flonase 50 mcg/inh nasal spray: 1 spray(s) nasal once a day  lactulose 10 g/15 mL oral syrup: 22.5 milliliter(s) orally once a day, As Needed  Lipitor 10 mg oral tablet: 1 tab(s) orally once a day (at bedtime)  MiraLax - oral powder for reconstitution: 17 gram(s) orally once a day  Norvasc 5 mg oral tablet: 1 tab(s) orally once a day  omeprazole 20 mg oral delayed release capsule: 1 cap(s) orally once a day  senna oral tablet: 2 tab(s) orally once a day (at bedtime)  Singulair 10 mg oral tablet: 1 tab(s) orally once a day   Advair  mcg-21 mcg/inh inhalation aerosol: 2 puff(s) inhaled 2 times a day  enoxaparin 40 mg/0.4 mL injectable solution: Inject syringe into abdomen once daily as directed by [facility] [phone] MDD:40mg  Flonase 50 mcg/inh nasal spray: 1 spray(s) nasal once a day  Linzess 290 mcg oral capsule: 1 cap(s) orally once a day  Lipitor 10 mg oral tablet: 1 tab(s) orally once a day (at bedtime)  Multiple Vitamins oral tablet: 1 tab(s) orally once a day  Norvasc 5 mg oral tablet: 1 tab(s) orally once a day  omeprazole 20 mg oral delayed release capsule: 1 cap(s) orally once a day  polyethylene glycol 3350 oral powder for reconstitution: 17 gram(s) orally once a day  senna oral tablet: 2 tab(s) orally once a day (at bedtime)  Singulair 10 mg oral tablet: 1 tab(s) orally once a day  traMADol 50 mg oral tablet: 1 tab(s) orally every 6 hours, As needed, Moderate Pain (4 - 6)  Tylenol 325 mg oral tablet: 2 tab(s) orally every 6 hours, As Needed for mild pain

## 2021-06-18 NOTE — PHARMACOTHERAPY INTERVENTION NOTE - COMMENTS
Tramadol 25mg and 50mg po q6h ATC was ordered. MD oked to change to prn mild pain and prn moderate pain.
Pepcid 20mg po bid was ordered when pt was already on protonix 40mg po qd.

## 2021-06-18 NOTE — H&P ADULT - ASSESSMENT
#S/p mechanical fall with LT hip fracture  Admit to 2N  Ortho eval appreciated  Bedrest  Pain meds prn  NPO for OR today  Hold chemical DVT proph for OR  EKG- NSR  No medical contraindication for OR, pt is medically optimized    #HTN/hyperlipidemia  Cont home meds    #Asthma  No wheezing now    #DVT proph- on hold for OR    #COVID neg. Completed Pfizer x2    #Dispo- inpatient admit. Patient is medically optimized for OR. D/w daughter at bedside and ortho team

## 2021-06-18 NOTE — DISCHARGE NOTE PROVIDER - NSDCFUADDINST_GEN_ALL_CORE_FT
Discharge Instructions for Left Hip IMN:    1. PAIN CONTROL: See Med Rec.  2. ACTIVITY: Weight Bearing as Tolerated with assistance and rolling walker  3. PT: daily  4. DVT/PE PROPHYLAXIS: Continue DVT/PE Prophylaxis. See Med Rec for Duration and dose.  5. BANDAGE: Change bandage on POD 7 (6/25/21) to dry gauze and tegaderm or paper tape. Can also change PRN if saturated. Do NOT remove on arrival to inspect wound.  6. STAPLES: Remove by RN POD14 (7/2/21)  7. FOLLOW UP: Follow-up with Orthopedic Surgeon Dr. Garcia in 14 Days. Call Office For Appointment.

## 2021-06-19 LAB
ANION GAP SERPL CALC-SCNC: 8 MMOL/L — SIGNIFICANT CHANGE UP (ref 5–17)
BUN SERPL-MCNC: 19 MG/DL — SIGNIFICANT CHANGE UP (ref 7–23)
CALCIUM SERPL-MCNC: 8.4 MG/DL — LOW (ref 8.5–10.1)
CHLORIDE SERPL-SCNC: 103 MMOL/L — SIGNIFICANT CHANGE UP (ref 96–108)
CO2 SERPL-SCNC: 24 MMOL/L — SIGNIFICANT CHANGE UP (ref 22–31)
CREAT SERPL-MCNC: 0.73 MG/DL — SIGNIFICANT CHANGE UP (ref 0.5–1.3)
GLUCOSE SERPL-MCNC: 121 MG/DL — HIGH (ref 70–99)
HCT VFR BLD CALC: 27.6 % — LOW (ref 34.5–45)
HGB BLD-MCNC: 8.9 G/DL — LOW (ref 11.5–15.5)
MCHC RBC-ENTMCNC: 29.4 PG — SIGNIFICANT CHANGE UP (ref 27–34)
MCHC RBC-ENTMCNC: 32.2 GM/DL — SIGNIFICANT CHANGE UP (ref 32–36)
MCV RBC AUTO: 91.1 FL — SIGNIFICANT CHANGE UP (ref 80–100)
PLATELET # BLD AUTO: 194 K/UL — SIGNIFICANT CHANGE UP (ref 150–400)
POTASSIUM SERPL-MCNC: 4.3 MMOL/L — SIGNIFICANT CHANGE UP (ref 3.5–5.3)
POTASSIUM SERPL-SCNC: 4.3 MMOL/L — SIGNIFICANT CHANGE UP (ref 3.5–5.3)
RBC # BLD: 3.03 M/UL — LOW (ref 3.8–5.2)
RBC # FLD: 13.6 % — SIGNIFICANT CHANGE UP (ref 10.3–14.5)
SODIUM SERPL-SCNC: 135 MMOL/L — SIGNIFICANT CHANGE UP (ref 135–145)
WBC # BLD: 12.24 K/UL — HIGH (ref 3.8–10.5)
WBC # FLD AUTO: 12.24 K/UL — HIGH (ref 3.8–10.5)

## 2021-06-19 PROCEDURE — 99231 SBSQ HOSP IP/OBS SF/LOW 25: CPT

## 2021-06-19 PROCEDURE — 99232 SBSQ HOSP IP/OBS MODERATE 35: CPT

## 2021-06-19 RX ORDER — LINACLOTIDE 145 UG/1
290 CAPSULE, GELATIN COATED ORAL
Refills: 0 | Status: DISCONTINUED | OUTPATIENT
Start: 2021-06-19 | End: 2021-06-21

## 2021-06-19 RX ORDER — LINACLOTIDE 145 UG/1
1 CAPSULE, GELATIN COATED ORAL
Qty: 0 | Refills: 0 | DISCHARGE

## 2021-06-19 RX ADMIN — TRAMADOL HYDROCHLORIDE 50 MILLIGRAM(S): 50 TABLET ORAL at 02:30

## 2021-06-19 RX ADMIN — BUDESONIDE AND FORMOTEROL FUMARATE DIHYDRATE 2 PUFF(S): 160; 4.5 AEROSOL RESPIRATORY (INHALATION) at 20:31

## 2021-06-19 RX ADMIN — Medication 500 MILLIGRAM(S): at 21:11

## 2021-06-19 RX ADMIN — Medication 975 MILLIGRAM(S): at 06:04

## 2021-06-19 RX ADMIN — MONTELUKAST 10 MILLIGRAM(S): 4 TABLET, CHEWABLE ORAL at 21:12

## 2021-06-19 RX ADMIN — PANTOPRAZOLE SODIUM 40 MILLIGRAM(S): 20 TABLET, DELAYED RELEASE ORAL at 09:59

## 2021-06-19 RX ADMIN — Medication 500 MILLIGRAM(S): at 09:58

## 2021-06-19 RX ADMIN — ATORVASTATIN CALCIUM 10 MILLIGRAM(S): 80 TABLET, FILM COATED ORAL at 21:11

## 2021-06-19 RX ADMIN — Medication 975 MILLIGRAM(S): at 21:12

## 2021-06-19 RX ADMIN — TRAMADOL HYDROCHLORIDE 50 MILLIGRAM(S): 50 TABLET ORAL at 21:12

## 2021-06-19 RX ADMIN — BUDESONIDE AND FORMOTEROL FUMARATE DIHYDRATE 2 PUFF(S): 160; 4.5 AEROSOL RESPIRATORY (INHALATION) at 08:36

## 2021-06-19 RX ADMIN — Medication 100 MILLIGRAM(S): at 05:56

## 2021-06-19 RX ADMIN — ENOXAPARIN SODIUM 40 MILLIGRAM(S): 100 INJECTION SUBCUTANEOUS at 05:56

## 2021-06-19 RX ADMIN — Medication 975 MILLIGRAM(S): at 05:56

## 2021-06-19 RX ADMIN — SENNA PLUS 2 TABLET(S): 8.6 TABLET ORAL at 21:11

## 2021-06-19 RX ADMIN — AMLODIPINE BESYLATE 5 MILLIGRAM(S): 2.5 TABLET ORAL at 09:59

## 2021-06-19 RX ADMIN — Medication 1 MILLIGRAM(S): at 09:58

## 2021-06-19 RX ADMIN — TRAMADOL HYDROCHLORIDE 50 MILLIGRAM(S): 50 TABLET ORAL at 22:10

## 2021-06-19 RX ADMIN — Medication 1 SPRAY(S): at 06:03

## 2021-06-19 RX ADMIN — Medication 1 TABLET(S): at 09:58

## 2021-06-19 RX ADMIN — Medication 1 SPRAY(S): at 18:03

## 2021-06-19 RX ADMIN — TRAMADOL HYDROCHLORIDE 50 MILLIGRAM(S): 50 TABLET ORAL at 01:35

## 2021-06-19 RX ADMIN — Medication 975 MILLIGRAM(S): at 14:02

## 2021-06-19 RX ADMIN — Medication 975 MILLIGRAM(S): at 14:30

## 2021-06-19 NOTE — PHYSICAL THERAPY INITIAL EVALUATION ADULT - ADDITIONAL COMMENTS
Pt lives in a house with no steps to enter and no steps to bedroom. Pt was household ambulator with a single point cane. Pt has an aide 40 hours/ week

## 2021-06-19 NOTE — PHYSICAL THERAPY INITIAL EVALUATION ADULT - GENERAL OBSERVATIONS, REHAB EVAL
Pt encountered supine in bed on 2 north. Pt required daughter as . Pt tolerated evaluation well. Pt performed ther ex, bed mobility, transfer training, and ambulation. Pt returned to bedside chair in no acute distress, call bell within reach, chair alarm on.

## 2021-06-20 LAB
ANION GAP SERPL CALC-SCNC: 4 MMOL/L — LOW (ref 5–17)
BUN SERPL-MCNC: 23 MG/DL — SIGNIFICANT CHANGE UP (ref 7–23)
CALCIUM SERPL-MCNC: 8.3 MG/DL — LOW (ref 8.5–10.1)
CHLORIDE SERPL-SCNC: 103 MMOL/L — SIGNIFICANT CHANGE UP (ref 96–108)
CO2 SERPL-SCNC: 27 MMOL/L — SIGNIFICANT CHANGE UP (ref 22–31)
CREAT SERPL-MCNC: 0.66 MG/DL — SIGNIFICANT CHANGE UP (ref 0.5–1.3)
GLUCOSE SERPL-MCNC: 110 MG/DL — HIGH (ref 70–99)
HCT VFR BLD CALC: 26.7 % — LOW (ref 34.5–45)
HGB BLD-MCNC: 8.8 G/DL — LOW (ref 11.5–15.5)
MCHC RBC-ENTMCNC: 29.5 PG — SIGNIFICANT CHANGE UP (ref 27–34)
MCHC RBC-ENTMCNC: 33 GM/DL — SIGNIFICANT CHANGE UP (ref 32–36)
MCV RBC AUTO: 89.6 FL — SIGNIFICANT CHANGE UP (ref 80–100)
PLATELET # BLD AUTO: 204 K/UL — SIGNIFICANT CHANGE UP (ref 150–400)
POTASSIUM SERPL-MCNC: 4.4 MMOL/L — SIGNIFICANT CHANGE UP (ref 3.5–5.3)
POTASSIUM SERPL-SCNC: 4.4 MMOL/L — SIGNIFICANT CHANGE UP (ref 3.5–5.3)
RBC # BLD: 2.98 M/UL — LOW (ref 3.8–5.2)
RBC # FLD: 13.6 % — SIGNIFICANT CHANGE UP (ref 10.3–14.5)
SODIUM SERPL-SCNC: 134 MMOL/L — LOW (ref 135–145)
WBC # BLD: 10.2 K/UL — SIGNIFICANT CHANGE UP (ref 3.8–10.5)
WBC # FLD AUTO: 10.2 K/UL — SIGNIFICANT CHANGE UP (ref 3.8–10.5)

## 2021-06-20 PROCEDURE — 99232 SBSQ HOSP IP/OBS MODERATE 35: CPT

## 2021-06-20 PROCEDURE — 99231 SBSQ HOSP IP/OBS SF/LOW 25: CPT

## 2021-06-20 RX ADMIN — Medication 975 MILLIGRAM(S): at 06:39

## 2021-06-20 RX ADMIN — Medication 975 MILLIGRAM(S): at 12:49

## 2021-06-20 RX ADMIN — Medication 500 MILLIGRAM(S): at 12:50

## 2021-06-20 RX ADMIN — ENOXAPARIN SODIUM 40 MILLIGRAM(S): 100 INJECTION SUBCUTANEOUS at 06:39

## 2021-06-20 RX ADMIN — TRAMADOL HYDROCHLORIDE 50 MILLIGRAM(S): 50 TABLET ORAL at 20:28

## 2021-06-20 RX ADMIN — Medication 1 TABLET(S): at 12:49

## 2021-06-20 RX ADMIN — TRAMADOL HYDROCHLORIDE 50 MILLIGRAM(S): 50 TABLET ORAL at 21:25

## 2021-06-20 RX ADMIN — Medication 1 MILLIGRAM(S): at 12:49

## 2021-06-20 RX ADMIN — ATORVASTATIN CALCIUM 10 MILLIGRAM(S): 80 TABLET, FILM COATED ORAL at 21:21

## 2021-06-20 RX ADMIN — SENNA PLUS 2 TABLET(S): 8.6 TABLET ORAL at 21:21

## 2021-06-20 RX ADMIN — Medication 975 MILLIGRAM(S): at 21:21

## 2021-06-20 RX ADMIN — MONTELUKAST 10 MILLIGRAM(S): 4 TABLET, CHEWABLE ORAL at 21:21

## 2021-06-20 RX ADMIN — Medication 975 MILLIGRAM(S): at 06:40

## 2021-06-20 RX ADMIN — LINACLOTIDE 290 MICROGRAM(S): 145 CAPSULE, GELATIN COATED ORAL at 06:39

## 2021-06-20 RX ADMIN — Medication 975 MILLIGRAM(S): at 22:13

## 2021-06-20 RX ADMIN — BUDESONIDE AND FORMOTEROL FUMARATE DIHYDRATE 2 PUFF(S): 160; 4.5 AEROSOL RESPIRATORY (INHALATION) at 20:26

## 2021-06-20 RX ADMIN — BUDESONIDE AND FORMOTEROL FUMARATE DIHYDRATE 2 PUFF(S): 160; 4.5 AEROSOL RESPIRATORY (INHALATION) at 09:04

## 2021-06-20 RX ADMIN — Medication 500 MILLIGRAM(S): at 21:21

## 2021-06-20 RX ADMIN — Medication 1 SPRAY(S): at 06:39

## 2021-06-20 RX ADMIN — AMLODIPINE BESYLATE 5 MILLIGRAM(S): 2.5 TABLET ORAL at 12:49

## 2021-06-20 RX ADMIN — Medication 1 SPRAY(S): at 18:52

## 2021-06-20 RX ADMIN — PANTOPRAZOLE SODIUM 40 MILLIGRAM(S): 20 TABLET, DELAYED RELEASE ORAL at 12:49

## 2021-06-21 ENCOUNTER — TRANSCRIPTION ENCOUNTER (OUTPATIENT)
Age: 86
End: 2021-06-21

## 2021-06-21 VITALS
TEMPERATURE: 99 F | RESPIRATION RATE: 18 BRPM | HEART RATE: 68 BPM | OXYGEN SATURATION: 94 % | DIASTOLIC BLOOD PRESSURE: 62 MMHG | SYSTOLIC BLOOD PRESSURE: 133 MMHG

## 2021-06-21 LAB
ANION GAP SERPL CALC-SCNC: 2 MMOL/L — LOW (ref 5–17)
BUN SERPL-MCNC: 16 MG/DL — SIGNIFICANT CHANGE UP (ref 7–23)
CALCIUM SERPL-MCNC: 8.4 MG/DL — LOW (ref 8.5–10.1)
CHLORIDE SERPL-SCNC: 106 MMOL/L — SIGNIFICANT CHANGE UP (ref 96–108)
CO2 SERPL-SCNC: 29 MMOL/L — SIGNIFICANT CHANGE UP (ref 22–31)
CREAT SERPL-MCNC: 0.44 MG/DL — LOW (ref 0.5–1.3)
GLUCOSE SERPL-MCNC: 99 MG/DL — SIGNIFICANT CHANGE UP (ref 70–99)
HCT VFR BLD CALC: 24.8 % — LOW (ref 34.5–45)
HCT VFR BLD CALC: 28.3 % — LOW (ref 34.5–45)
HGB BLD-MCNC: 8 G/DL — LOW (ref 11.5–15.5)
HGB BLD-MCNC: 9.1 G/DL — LOW (ref 11.5–15.5)
MCHC RBC-ENTMCNC: 29.5 PG — SIGNIFICANT CHANGE UP (ref 27–34)
MCHC RBC-ENTMCNC: 29.6 PG — SIGNIFICANT CHANGE UP (ref 27–34)
MCHC RBC-ENTMCNC: 32.2 GM/DL — SIGNIFICANT CHANGE UP (ref 32–36)
MCHC RBC-ENTMCNC: 32.3 GM/DL — SIGNIFICANT CHANGE UP (ref 32–36)
MCV RBC AUTO: 91.5 FL — SIGNIFICANT CHANGE UP (ref 80–100)
MCV RBC AUTO: 92.2 FL — SIGNIFICANT CHANGE UP (ref 80–100)
PLATELET # BLD AUTO: 212 K/UL — SIGNIFICANT CHANGE UP (ref 150–400)
PLATELET # BLD AUTO: 249 K/UL — SIGNIFICANT CHANGE UP (ref 150–400)
POTASSIUM SERPL-MCNC: 4.5 MMOL/L — SIGNIFICANT CHANGE UP (ref 3.5–5.3)
POTASSIUM SERPL-SCNC: 4.5 MMOL/L — SIGNIFICANT CHANGE UP (ref 3.5–5.3)
RBC # BLD: 2.71 M/UL — LOW (ref 3.8–5.2)
RBC # BLD: 3.07 M/UL — LOW (ref 3.8–5.2)
RBC # FLD: 13.9 % — SIGNIFICANT CHANGE UP (ref 10.3–14.5)
RBC # FLD: 14.2 % — SIGNIFICANT CHANGE UP (ref 10.3–14.5)
SARS-COV-2 RNA SPEC QL NAA+PROBE: SIGNIFICANT CHANGE UP
SODIUM SERPL-SCNC: 137 MMOL/L — SIGNIFICANT CHANGE UP (ref 135–145)
WBC # BLD: 10.01 K/UL — SIGNIFICANT CHANGE UP (ref 3.8–10.5)
WBC # BLD: 9.63 K/UL — SIGNIFICANT CHANGE UP (ref 3.8–10.5)
WBC # FLD AUTO: 10.01 K/UL — SIGNIFICANT CHANGE UP (ref 3.8–10.5)
WBC # FLD AUTO: 9.63 K/UL — SIGNIFICANT CHANGE UP (ref 3.8–10.5)

## 2021-06-21 PROCEDURE — 99231 SBSQ HOSP IP/OBS SF/LOW 25: CPT

## 2021-06-21 PROCEDURE — 99239 HOSP IP/OBS DSCHRG MGMT >30: CPT

## 2021-06-21 RX ORDER — ACETAMINOPHEN 500 MG
2 TABLET ORAL
Qty: 0 | Refills: 0 | DISCHARGE

## 2021-06-21 RX ORDER — POLYETHYLENE GLYCOL 3350 17 G/17G
17 POWDER, FOR SOLUTION ORAL
Qty: 0 | Refills: 0 | DISCHARGE
Start: 2021-06-21

## 2021-06-21 RX ORDER — POLYETHYLENE GLYCOL 3350 17 G/17G
17 POWDER, FOR SOLUTION ORAL DAILY
Refills: 0 | Status: DISCONTINUED | OUTPATIENT
Start: 2021-06-21 | End: 2021-06-21

## 2021-06-21 RX ORDER — TRAMADOL HYDROCHLORIDE 50 MG/1
1 TABLET ORAL
Qty: 0 | Refills: 0 | DISCHARGE
Start: 2021-06-21

## 2021-06-21 RX ADMIN — BUDESONIDE AND FORMOTEROL FUMARATE DIHYDRATE 2 PUFF(S): 160; 4.5 AEROSOL RESPIRATORY (INHALATION) at 08:19

## 2021-06-21 RX ADMIN — Medication 1 SPRAY(S): at 04:54

## 2021-06-21 RX ADMIN — POLYETHYLENE GLYCOL 3350 17 GRAM(S): 17 POWDER, FOR SOLUTION ORAL at 10:24

## 2021-06-21 RX ADMIN — AMLODIPINE BESYLATE 5 MILLIGRAM(S): 2.5 TABLET ORAL at 10:24

## 2021-06-21 RX ADMIN — Medication 975 MILLIGRAM(S): at 14:44

## 2021-06-21 RX ADMIN — Medication 500 MILLIGRAM(S): at 10:23

## 2021-06-21 RX ADMIN — LINACLOTIDE 290 MICROGRAM(S): 145 CAPSULE, GELATIN COATED ORAL at 08:40

## 2021-06-21 RX ADMIN — ENOXAPARIN SODIUM 40 MILLIGRAM(S): 100 INJECTION SUBCUTANEOUS at 04:54

## 2021-06-21 RX ADMIN — Medication 1 MILLIGRAM(S): at 10:24

## 2021-06-21 RX ADMIN — Medication 1 TABLET(S): at 10:24

## 2021-06-21 RX ADMIN — Medication 975 MILLIGRAM(S): at 04:56

## 2021-06-21 RX ADMIN — PANTOPRAZOLE SODIUM 40 MILLIGRAM(S): 20 TABLET, DELAYED RELEASE ORAL at 10:24

## 2021-06-21 RX ADMIN — Medication 975 MILLIGRAM(S): at 04:53

## 2021-06-21 RX ADMIN — Medication 975 MILLIGRAM(S): at 14:45

## 2021-06-21 NOTE — PROGRESS NOTE ADULT - ASSESSMENT
This is a 89 year old female s/p fall sustaining left hip fracture, NPO for OR for IM nail this afternoon with Dr. Garcia. She is high risk for VTE due to age, BMI, impaired mobility, and surgery. She is low risk for bleeding.    Plan:  ::Lovenox 40 mg SQ daily x 4 week post procedure last dose on 7-. rx sent to pharmacy, cost for pt is $0  ::Daily CBC/BMP  ::Venodynes b/l  ::Enc ambulation per PT eval     Will continue to follow.  Dispo: Home vs Rehab  
This is a 89 year old female s/p fall sustaining left hip fracture, NPO for OR for IM nail this afternoon with Dr. Garcia. She is high risk for VTE due to age, BMI, impaired mobility, and surgery. She is low risk for bleeding.    Plan:  ::Lovenox 40 mg SQ daily x 4 week post procedure last dose on on 7-. rx sent to pharmacy, cost for pt is $0  ::Daily CBC/BMP  ::Venodynes b/l  ::Enc ambulation per PT eval   will continue to follow.  Dispo: Home  
This is a 89 year old female s/p fall sustaining left hip fracture, NPO for OR for IM nail this afternoon with Dr. Garcia. She is high risk for VTE due to age, BMI, impaired mobility, and surgery. She is low risk for bleeding.    Plan:  ::Lovenox 40 mg SQ daily x 4 week post procedure last dose on on 7-. rx sent to pharmacy, cost for pt is $0  ::Daily CBC/BMP  ::Venodynes b/l  ::Enc ambulation per PT eval   will continue to follow.  Dispo: Home, [facility]  
A/P: 89F s/p L Hip IMN POD 1  Analgesia  DVT ppx  WBAT  PT/OT  Encourage incentive spirometry  DC planning pending PT eval  Will discuss with attending and advise if plan changes

## 2021-06-21 NOTE — PROGRESS NOTE ADULT - PROVIDER SPECIALTY LIST ADULT
Hospitalist
Orthopedics
Orthopedics
Anticoag Management
Orthopedics
Orthopedics

## 2021-06-21 NOTE — PROGRESS NOTE ADULT - REASON FOR ADMISSION
S/p mechanical fall

## 2021-06-21 NOTE — PROGRESS NOTE ADULT - SUBJECTIVE AND OBJECTIVE BOX
c/c: fall/left hip pain      HPI:  89yo/F with PMH HTN, hyperlipidemia, dementia, asthma, chronic back pain, OA s/p prior LT TKR presented s/p mechanical fall at home. Per daughter at bedside, she was trying to sit down on a chair and missed it, fell on the floor and could not get up. Denies LOC, no head trauma. No cardiac history. Found to have. Found to have LT hip fracture. Underwent IM nail 6/18.     6/19: pt seen and examined this am. Doing well. Pain controlled. No sob/chest pain. Tolerating po. No difficulty voiding. Got from bed to chair with physical therapy. Daughter at bedside.       Review of system- All 10 systems reviewed and is as per HPI otherwise negative.     VITALS  T(C): 36.9 (06-19-21 @ 09:10), Max: 36.9 (06-19-21 @ 09:10)  HR: 66 (06-19-21 @ 09:10) (65 - 91)  BP: 113/55 (06-19-21 @ 09:10) (96/65 - 121/61)  RR: 16 (06-19-21 @ 09:10) (13 - 18)  SpO2: 98% (06-19-21 @ 09:10) (94% - 98%)    PHYSICAL EXAM:    GENERAL: Comfortable, no acute distress  HEAD:  Atraumatic, Normocephalic  EYES: EOMI, PERRLA  HEENT: Moist mucous membranes  NECK: Supple, No JVD  NERVOUS SYSTEM:  Alert & Oriented X3, non focal.   CHEST/LUNG: Clear to auscultation bilaterally  HEART: Regular rate and rhythm; No murmurs, rubs, or gallops  ABDOMEN: Soft, Nontender, Nondistended; Bowel sounds present  GENITOURINARY- Voiding, no palpable bladder  EXTREMITIES:  No clubbing, cyanosis, or edema  MUSCULOSKELETAL- Left hip dressing c/d/i.   SKIN-no rash        LABS:                        8.9    12.24 )-----------( 194      ( 19 Jun 2021 07:47 )             27.6     06-19    135  |  103  |  19  ----------------------------<  121<H>  4.3   |  24  |  0.73    Ca    8.4<L>      19 Jun 2021 07:47    TPro  x   /  Alb  3.1<L>  /  TBili  x   /  DBili  x   /  AST  x   /  ALT  x   /  AlkPhos  x   06-18    PT/INR - ( 18 Jun 2021 06:04 )   PT: 13.0 sec;   INR: 1.13 ratio         PTT - ( 18 Jun 2021 06:04 )  PTT:24.7 sec    MEDS  acetaminophen   Tablet .. 650 milliGRAM(s) Oral every 6 hours PRN  acetaminophen   Tablet .. 975 milliGRAM(s) Oral every 8 hours  amLODIPine   Tablet 5 milliGRAM(s) Oral daily  ascorbic acid 500 milliGRAM(s) Oral two times a day  atorvastatin 10 milliGRAM(s) Oral at bedtime  benzocaine 15 mG/menthol 3.6 mG (Sugar-Free) Lozenge 1 Lozenge Oral every 2 hours PRN  budesonide 160 MICROgram(s)/formoterol 4.5 MICROgram(s) Inhaler 2 Puff(s) Inhalation two times a day  enoxaparin Injectable 40 milliGRAM(s) SubCutaneous every 24 hours  fluticasone propionate 50 MICROgram(s)/spray Nasal Spray 1 Spray(s) Both Nostrils two times a day  folic acid 1 milliGRAM(s) Oral daily  lactated ringers. 1000 milliLiter(s) IV Continuous <Continuous>  magnesium hydroxide Suspension 30 milliLiter(s) Oral daily PRN  melatonin 3 milliGRAM(s) Oral at bedtime PRN  montelukast 10 milliGRAM(s) Oral at bedtime  morphine  - Injectable 2 milliGRAM(s) IV Push every 4 hours PRN  multivitamin 1 Tablet(s) Oral daily  ondansetron Injectable 4 milliGRAM(s) IV Push every 6 hours PRN  pantoprazole    Tablet 40 milliGRAM(s) Oral daily  senna 2 Tablet(s) Oral at bedtime  sodium chloride 0.9%. 1000 milliLiter(s) IV Continuous <Continuous>  traMADol 25 milliGRAM(s) Oral every 6 hours PRN  traMADol 50 milliGRAM(s) Oral every 6 hours PRN      ASSESSMENT AND PLAN:    #S/p mechanical fall with LT hip fracture  -S/P IM nail POD#1  -pain control  -physical therapy  -incentive spirometry  -bowel regimen.     #Acute blood loss anemia:  -d/t surgery  -no need for transfusion at this time.     #HTN  -continue norvasc.     #hyperlipidemia  -statin    #Asthma  -no s/o exacerbation  -prn albuterol  -singulair    #DVT px:  -lovenox    #dispo  -d/w daughter at bedside, wants pt to come home.   pt lives wtih daughter, on main floor, does not have stairs.   dc planning in next 24-48 hours if h/h stable and does better with physical therapy,.      
Orthopedics    Patient seen and examined at bedside, resting comfortably. Daughter at bedside assist w/ history/translation. No acute events overnight. Pain adequately controlled. Patient feeling well. Denies CP/SOB. No nausea or vomiting. No other acute complaints at this time.     Vital Signs Last 24 Hrs  T(C): 36.6 (06-20-21 @ 00:20), Max: 37 (06-19-21 @ 19:44)  T(F): 97.8 (06-20-21 @ 00:20), Max: 98.6 (06-19-21 @ 19:44)  HR: 73 (06-20-21 @ 00:20) (66 - 88)  BP: 134/55 (06-20-21 @ 00:20) (113/55 - 134/55)  BP(mean): --  RR: 16 (06-20-21 @ 00:20) (16 - 16)  SpO2: 94% (06-20-21 @ 00:20) (94% - 98%)                        8.9    12.24 )-----------( 194      ( 19 Jun 2021 07:47 )             27.6     19 Jun 2021 07:47    135    |  103    |  19     ----------------------------<  121    4.3     |  24     |  0.73     Ca    8.4        19 Jun 2021 07:47    TPro  x      /  Alb  3.1    /  TBili  x      /  DBili  x      /  AST  x      /  ALT  x      /  AlkPhos  x      18 Jun 2021 06:04  PT/INR - ( 18 Jun 2021 06:04 )   PT: 13.0 sec;   INR: 1.13 ratio    PTT - ( 18 Jun 2021 06:04 )  PTT:24.7 sec    Exam:  Gen: NAD, Awake and alert  LLE:  Dressing clean and dry  +EHL/FHL/TA/GS  SILT L2-S1  +DP  Calf Soft NT  Compartments soft and compressible    A/P:  Patient is a 89y Female s/p L hip IMN Stable POD 3    -Med management per primary team  -FU am labs  -Ice/Elevate  -Incentive Spirometry  -Multimodal Analgesia  -DVT PE ppx - per ac team  -SCDs  -PT OOB   -WBAT  -Ortho stable  -FU outpatient, call office to schedule appointment  -Discharge planning - likely home pending primary team clearance  -Will discuss w/ attending and advise if plan changes
Patient seen and examined at bedside. Reports no acute complaints at this time. Pain is well controlled. No acute events overnight.    PHYSICAL EXAM:  Vital Signs Last 24 Hrs  T(C): 36.7 (19 Jun 2021 05:16), Max: 36.7 (18 Jun 2021 17:06)  T(F): 98.1 (19 Jun 2021 05:16), Max: 98.1 (19 Jun 2021 05:16)  HR: 69 (19 Jun 2021 05:16) (56 - 91)  BP: 121/61 (19 Jun 2021 05:16) (96/65 - 121/61)  BP(mean): 68 (18 Jun 2021 08:50) (68 - 68)  RR: 16 (19 Jun 2021 05:16) (13 - 18)  SpO2: 97% (19 Jun 2021 05:16) (94% - 98%)    Gen: NAD, AAOx3    Left Lower Extremity:  Dressing clean dry intact  +EHL/FHL/TA/GS  SILT L3-S1  +DP/PT Pulses  Compartments soft  No calf TTP B/L 
  c/c: fall/left hip pain      HPI:  89yo/F with PMH HTN, hyperlipidemia, dementia, asthma, chronic back pain, OA s/p prior LT TKR presented s/p mechanical fall at home. Per daughter at bedside, she was trying to sit down on a chair and missed it, fell on the floor and could not get up. Denies LOC, no head trauma. No cardiac history. Found to have. Found to have LT hip fracture. Underwent IM nail 6/18.     6/20; PT Seen and examined this am with daughter at bedside. Felt well. Pain controlled. c/o constipation.    Review of system- All 10 systems reviewed and is as per HPI otherwise negative.     Vital Signs Last 24 Hrs  T(C): 36.3 (20 Jun 2021 09:33), Max: 37 (19 Jun 2021 19:44)  T(F): 97.3 (20 Jun 2021 09:33), Max: 98.6 (19 Jun 2021 19:44)  HR: 74 (20 Jun 2021 09:33) (73 - 78)  BP: 108/56 (20 Jun 2021 09:33) (108/56 - 134/55)  RR: 16 (20 Jun 2021 09:33) (16 - 16)  SpO2: 97% (20 Jun 2021 09:33) (94% - 97%)    PHYSICAL EXAM:    GENERAL: Comfortable, no acute distress  HEAD:  Atraumatic, Normocephalic  EYES: EOMI, PERRLA  HEENT: Moist mucous membranes  NECK: Supple, No JVD  NERVOUS SYSTEM:  Alert & Oriented X3, non focal.   CHEST/LUNG: Clear to auscultation bilaterally  HEART: Regular rate and rhythm; No murmurs, rubs, or gallops  ABDOMEN: Soft, Nontender, Nondistended; Bowel sounds present  GENITOURINARY- Voiding, no palpable bladder  EXTREMITIES:  No clubbing, cyanosis, or edema  MUSCULOSKELETAL- Left hip dressing c/d/i.   SKIN-no rash    LABS:                        8.8    10.20 )-----------( 204      ( 20 Jun 2021 06:21 )             26.7     06-20    134<L>  |  103  |  23  ----------------------------<  110<H>  4.4   |  27  |  0.66    Ca    8.3<L>      20 Jun 2021 06:21  MEDICATIONS  (STANDING):  acetaminophen   Tablet .. 975 milliGRAM(s) Oral every 8 hours  amLODIPine   Tablet 5 milliGRAM(s) Oral daily  ascorbic acid 500 milliGRAM(s) Oral two times a day  atorvastatin 10 milliGRAM(s) Oral at bedtime  budesonide 160 MICROgram(s)/formoterol 4.5 MICROgram(s) Inhaler 2 Puff(s) Inhalation two times a day  enoxaparin Injectable 40 milliGRAM(s) SubCutaneous every 24 hours  fluticasone propionate 50 MICROgram(s)/spray Nasal Spray 1 Spray(s) Both Nostrils two times a day  folic acid 1 milliGRAM(s) Oral daily  lactated ringers. 1000 milliLiter(s) (75 mL/Hr) IV Continuous <Continuous>  linaclotide 290 MICROGram(s) Oral before breakfast  montelukast 10 milliGRAM(s) Oral at bedtime  multivitamin 1 Tablet(s) Oral daily  pantoprazole    Tablet 40 milliGRAM(s) Oral daily  senna 2 Tablet(s) Oral at bedtime  sodium chloride 0.9%. 1000 milliLiter(s) (75 mL/Hr) IV Continuous <Continuous>    MEDICATIONS  (PRN):  acetaminophen   Tablet .. 650 milliGRAM(s) Oral every 6 hours PRN Temp greater or equal to 38C (100.4F), Mild Pain (1 - 3)  benzocaine 15 mG/menthol 3.6 mG (Sugar-Free) Lozenge 1 Lozenge Oral every 2 hours PRN Sore Throat  magnesium hydroxide Suspension 30 milliLiter(s) Oral daily PRN Constipation  melatonin 3 milliGRAM(s) Oral at bedtime PRN Insomnia  morphine  - Injectable 2 milliGRAM(s) IV Push every 4 hours PRN Severe Pain (7 - 10)  ondansetron Injectable 4 milliGRAM(s) IV Push every 6 hours PRN Nausea and/or Vomiting  traMADol 25 milliGRAM(s) Oral every 6 hours PRN Mild Pain (1 - 3)  traMADol 50 milliGRAM(s) Oral every 6 hours PRN Moderate Pain (4 - 6)      ASSESSMENT AND PLAN:    #S/p mechanical fall with LT hip fracture  -S/P IM nail POD#2  -pain control  -physical therapy  -incentive spirometry  -bowel regimen.     #Acute blood loss anemia:  -d/t surgery  -no need for transfusion at this time.     #HTN  -continue norvasc.     #hyperlipidemia  -statin    #Asthma  -no s/o exacerbation  -prn albuterol  -singulair    #DVT px:  -lovenox    #Dispo:  home tomorrow if stable.     
  c/c: fall/left hip pain      HPI:  89yo/F with PMH HTN, hyperlipidemia, dementia, asthma, chronic back pain, OA s/p prior LT TKR presented s/p mechanical fall at home. Per daughter at bedside, she was trying to sit down on a chair and missed it, fell on the floor and could not get up. Denies LOC, no head trauma. No cardiac history. Found to have. Found to have LT hip fracture. Underwent IM nail 6/18.     6/21: pt seen and examined this am. Doing well. NO pain at rest. still with constipation.     Review of system- All 10 systems reviewed and is as per HPI otherwise negative.     Vital Signs Last 24 Hrs  T(C): 37.1 (21 Jun 2021 09:17), Max: 37.6 (20 Jun 2021 19:51)  T(F): 98.8 (21 Jun 2021 09:17), Max: 99.7 (20 Jun 2021 19:51)  HR: 68 (21 Jun 2021 09:17) (68 - 86)  BP: 133/62 (21 Jun 2021 09:17) (121/57 - 133/62)  BP(mean): 78 (21 Jun 2021 09:17) (73 - 78)  RR: 18 (21 Jun 2021 09:17) (18 - 18)  SpO2: 94% (21 Jun 2021 09:17) (94% - 98%)    PHYSICAL EXAM:    GENERAL: Comfortable, no acute distress  HEAD:  Atraumatic, Normocephalic  EYES: EOMI, PERRLA  HEENT: Moist mucous membranes  NECK: Supple, No JVD  NERVOUS SYSTEM:  Alert & Oriented X3, non focal.   CHEST/LUNG: Clear to auscultation bilaterally  HEART: Regular rate and rhythm; No murmurs, rubs, or gallops  ABDOMEN: Soft, Nontender, Nondistended; Bowel sounds present  GENITOURINARY- Voiding, no palpable bladder  EXTREMITIES:  No clubbing, cyanosis, or edema  MUSCULOSKELETAL- Left hip dressing c/d/i.   SKIN-no rash  LABS:                        9.1    10.01 )-----------( 249      ( 21 Jun 2021 10:06 )             28.3     06-21    137  |  106  |  16  ----------------------------<  99  4.5   |  29  |  0.44<L>    Ca    8.4<L>      21 Jun 2021 06:24      MEDICATIONS  (STANDING):  acetaminophen   Tablet .. 975 milliGRAM(s) Oral every 8 hours  amLODIPine   Tablet 5 milliGRAM(s) Oral daily  ascorbic acid 500 milliGRAM(s) Oral two times a day  atorvastatin 10 milliGRAM(s) Oral at bedtime  budesonide 160 MICROgram(s)/formoterol 4.5 MICROgram(s) Inhaler 2 Puff(s) Inhalation two times a day  enoxaparin Injectable 40 milliGRAM(s) SubCutaneous every 24 hours  fluticasone propionate 50 MICROgram(s)/spray Nasal Spray 1 Spray(s) Both Nostrils two times a day  folic acid 1 milliGRAM(s) Oral daily  lactated ringers. 1000 milliLiter(s) (75 mL/Hr) IV Continuous <Continuous>  linaclotide 290 MICROGram(s) Oral before breakfast  montelukast 10 milliGRAM(s) Oral at bedtime  multivitamin 1 Tablet(s) Oral daily  pantoprazole    Tablet 40 milliGRAM(s) Oral daily  senna 2 Tablet(s) Oral at bedtime  sodium chloride 0.9%. 1000 milliLiter(s) (75 mL/Hr) IV Continuous <Continuous>    MEDICATIONS  (PRN):  acetaminophen   Tablet .. 650 milliGRAM(s) Oral every 6 hours PRN Temp greater or equal to 38C (100.4F), Mild Pain (1 - 3)  benzocaine 15 mG/menthol 3.6 mG (Sugar-Free) Lozenge 1 Lozenge Oral every 2 hours PRN Sore Throat  magnesium hydroxide Suspension 30 milliLiter(s) Oral daily PRN Constipation  melatonin 3 milliGRAM(s) Oral at bedtime PRN Insomnia  morphine  - Injectable 2 milliGRAM(s) IV Push every 4 hours PRN Severe Pain (7 - 10)  ondansetron Injectable 4 milliGRAM(s) IV Push every 6 hours PRN Nausea and/or Vomiting  traMADol 25 milliGRAM(s) Oral every 6 hours PRN Mild Pain (1 - 3)  traMADol 50 milliGRAM(s) Oral every 6 hours PRN Moderate Pain (4 - 6)      ASSESSMENT AND PLAN:    #S/p mechanical fall with LT hip fracture  -S/P IM nail POD#3  -pain control  -physical therapy  -incentive spirometry  -bowel regimen.     #Acute blood loss anemia:  -d/t surgery  -no need for transfusion at this time.     #HTN  -continue norvasc.     #hyperlipidemia  -statin    #Asthma  -no s/o exacerbation  -prn albuterol  -singulair    #Constipation:  -on linzess  -add miralax  -enema if needed    #DVT px:  -lovenox    #Dispo:  family would like [facility] planning once bed available.   
Orthopedics    Patient seen and examined at bedside, resting comfortably. Daughter at bedside assist w/ history/translation. No acute events overnight. Pain adequately controlled. Patient feeling well. Denies CP/SOB. No nausea or vomiting. No other acute complaints at this time. Family/Pt planning to be discharged home when cleared by primary team.    Vital Signs Last 24 Hrs  T(C): 36.6 (06-20-21 @ 00:20), Max: 37 (06-19-21 @ 19:44)  T(F): 97.8 (06-20-21 @ 00:20), Max: 98.6 (06-19-21 @ 19:44)  HR: 73 (06-20-21 @ 00:20) (66 - 88)  BP: 134/55 (06-20-21 @ 00:20) (113/55 - 134/55)  BP(mean): --  RR: 16 (06-20-21 @ 00:20) (16 - 16)  SpO2: 94% (06-20-21 @ 00:20) (94% - 98%)                        8.9    12.24 )-----------( 194      ( 19 Jun 2021 07:47 )             27.6     19 Jun 2021 07:47    135    |  103    |  19     ----------------------------<  121    4.3     |  24     |  0.73     Ca    8.4        19 Jun 2021 07:47    TPro  x      /  Alb  3.1    /  TBili  x      /  DBili  x      /  AST  x      /  ALT  x      /  AlkPhos  x      18 Jun 2021 06:04  PT/INR - ( 18 Jun 2021 06:04 )   PT: 13.0 sec;   INR: 1.13 ratio    PTT - ( 18 Jun 2021 06:04 )  PTT:24.7 sec    Exam:  Gen: NAD, Awake and alert  LLE:  Dressing clean and dry  +EHL/FHL/TA/GS  SILT L2-S1  +DP  Calf Soft NT  Compartments soft and compressible    A/P:  Patient is a 89y Female s/p L hip IMN Stable POD 2    -Med management per primary team  -FU am labs  -Ice/Elevate  -Incentive Spirometry  -Multimodal Analgesia  -DVT PE ppx - per ac team  -SCDs  -PT OOB   -WBAT  -Ortho stable  -FU outpatient, call office to schedule appointment  -Discharge planning - likely home pending primary team clearance  -Will discuss w/ attending and advise if plan changes
Orthopedics Post-op Check    POD 0 IMN L hip   Pt seen and examined bedside with daughter present. Pain is controlled. Pt feeling well. No nausea or vomiting.     Vital Signs Last 24 Hrs  T(C): 36.7 (06-18-21 @ 18:45), Max: 37 (06-18-21 @ 00:31)  T(F): 98 (06-18-21 @ 18:45), Max: 98.6 (06-18-21 @ 00:31)  HR: 68 (06-18-21 @ 18:45) (56 - 86)  BP: 120/59 (06-18-21 @ 18:45) (104/56 - 144/77)  BP(mean): 68 (06-18-21 @ 08:50) (68 - 82)  RR: 17 (06-18-21 @ 18:45) (13 - 18)  SpO2: 95% (06-18-21 @ 18:45) (94% - 98%)                        10.4   14.94 )-----------( 207      ( 18 Jun 2021 17:35 )             31.7     18 Jun 2021 17:35    134    |  105    |  18     ----------------------------<  121    3.9     |  22     |  0.58     Ca    8.0        18 Jun 2021 17:35    TPro  x      /  Alb  3.1    /  TBili  x      /  DBili  x      /  AST  x      /  ALT  x      /  AlkPhos  x      18 Jun 2021 06:04    PT/INR - ( 18 Jun 2021 06:04 )   PT: 13.0 sec;   INR: 1.13 ratio         PTT - ( 18 Jun 2021 06:04 )  PTT:24.7 sec    Exam:  NAD AAOx3  Dressing clean and dry  +EHL FHL TA GS  SILT toes 1-5  +DP  Calf Soft NT    A/P:  Stable POD 0 L hip IMN   -Analgesia  -Ppx ABX  -DVT PE ppx  -OOB PT  -Encourage IS 
HPI:  Patient is a 89y old  Female who presents with a chief complaint of S/p mechanical fall    Consulted by Dr. Garcia for VTE prophylaxis, risk stratification, and anticoagulation management.    PAST MEDICAL & SURGICAL HISTORY:  Asthma    HTN (hypertension)    Dyslipidemia    S/P knee replacement  left    Interval History:  : Patient seen at bedside pre-op. Daughter is permitted to remain at bedside due to [place] speaking only and dialect uninterpreted via [place] . Daughter relays that he Mom lives with her and was going to sit down in chair and slipped forward as she was sitting. Denies any head trauma.   Explained my role with anticoagulation and discussed necessity for Lovenox post-op for four weeks. Daughter is a  and has no issues giving injection "As long as you go over it with me again." Other Daughter will also be teaming up to help with Mother's care at home. They do not want to take her to rehab.   2021 Pt seen at bedside this afternoon with daughter present oob in chair having lunch.  Discussed with daughter the use of Lovenox as her anticoagulation medication.  She states her sister is a  and she will administer Lovenox  No concerns nd v/u the need.  BMI: 30.9    CrCl: 803    ebl: 50ml    Caprini VTE Risk Score:  CAPRINI SCORE  AGE RELATED RISK FACTORS                                                       MOBILITY RELATED FACTORS  [ ] Age 41-60 years                                            (1 Point)                  [ ] Bed rest /restricted mobility                      (1 Point)  [ ] Age: 61-74 years                                           (2 Points)                [ ] Plaster cast                                                   (2 Points)  [x ] Age= 75 years                                              (3 Points)                 [ ] Bed bound for more than 72 hours                   (2 Points)    DISEASE RELATED RISK FACTORS                                               GENDER SPECIFIC FACTORS  [ ] Edema in the lower extremities                       (1 Point)           [ ] Pregnancy                                                            (1 Point)  [ ] Varicose veins                                               (1 Point)                  [ ] Post-partum < 6 weeks                                      (1 Point)             [x ] BMI > 25 Kg/m2                                            (1 Point)                  [ ] Hormonal therapy or oral contraception       (1 Point)                 [ ] Sepsis (in the previous month)                        (1 Point)             [ ] History of pregnancy complications                (1Point)  [ ] Pneumonia or serious lung disease                                             [ ] Unexplained or recurrent  (=/>3), premature                                 (In the previous month)                               (1 Point)                birth with toxemia or growth-restricted infant (1 Point)  [ ] Abnormal pulmonary function test            (1 Point)                                   SURGERY RELATED RISK FACTORS  [ ] Acute myocardial infarction                       (1 Point)                  [ ]  Section                                         (1 Point)  [ ] Congestive heart failure (in the previous month) (1 Point)   [ ] Minor surgery   lasting <45 minutes       (1 Point)   [ ] Inflammatory bowel disease                             (1 Point)          [ ] Arthroscopic surgery                                  (2 Points)  [ ] Central venous access                                    (2 Points)            [ ] General surgery lasting >45 minutes      (2 Points)       [ ] Stroke (in the previous month)                  (5 Points)            [ ] Elective major lower extremity arthroplasty (5 Points)                                   [  ] Malignancy (present or past include skin melanoma                                          but exclude  basal skin cell)    (2 points)                                      TRAUMA RELATED RISK FACTORS                HEMATOLOGY RELATED FACTORS                                  [x ] Fracture of the hip, pelvis, or leg                       (5 Points)  [ ] Prior episodes of VTE                                     (3 Points)          [ ] Acute spinal cord injury (in the previous month)  (5 Points)  [ ] Positive family history for VTE                         (3 Points)       [ ] Paralysis (less than 1 month)                          (5 Points)  [ ] Prothrombin 97469 A                                      (3 Points)         [ ] Multiple Trauma (within 1month)                 (5Points)                                                                                                                                                                [ ] Factor V Leiden                                          (3 Points)                                OTHER RISK FACTORS                          [ ] Lupus anticoagulants                                     (3 Points)                     [ ] BMI > 40                          (1 Point)                                                         [ ] Anticardiolipin antibodies                                (3 Points)                 [ ] Smoking                              (1Point)                                                [ ] High homocysteine in the blood                      (3 Points)                [  ] Diabetes requiring insulin (1point)                         [ ] Other congenital or acquired thrombophilia       (3 Points)          [  ] Chemotherapy                   (1 Point)  [ ] Heparin induced thrombocytopenia                  (3 Points)             [  ] Blood Transfusion                (1 point)                                                                                                             Total Score [  9        ]                                                                                                                                                                                                                                                                                                                                                                                                                                       IMPROVE Bleeding Risk Score:3.5      Falls Risk:   High ( x )  Mod (  )  Low (  )      FAMILY HISTORY:  No pertinent family history in first degree relatives      Denies any personal or familial history of clotting or bleeding disorders.    Allergies    codeine (Unknown)    Intolerances        REVIEW OF SYSTEMS    (  )Fever	        (  )Constipation	(  )SOB				  (  )Headache   (  )Dysuria  (  )Chills	        (  )Melena	        (  )Dyspnea on exertion (  )Dizziness    (  )Polyuria  (  )Nausea      (  )Hematochezia	(  )Cough                          (  )Syncope      (  )Hematuria  (  )Vomiting   (  )Chest Pain	        (  )Wheezing			  (  )Weakness  (  )Diarrhea    (  )Palpitations	(  )Anorexia			  (x  )Myalgia       (  x ) Arthralgia    Pertinent positives in HPI and daily subjective. All other systems negative.    Vital Signs Last 24 Hrs  T(C): 36.9 (21 @ 09:10), Max: 36.9 (21 @ 09:10)  T(F): 98.4 (21 @ 09:10), Max: 98.4 (21 @ 09:10)  HR: 66 (21 @ 09:10) (66 - 91)  BP: 113/55 (21 @ 09:10) (96/65 - 121/61)  BP(mean): --  RR: 16 (21 @ 09:10) (16 - 16)  SpO2: 98% (21 @ 09:10) (95% - 98%)  SpO2: 95% (21 @ 08:50) (95% - 98%)      PHYSICAL EXAM:    Constitutional: Appears Well    Neurological: A& O x 3    Skin: Warm    Respiratory and Thorax: normal effort; Breath sounds: normal; No rales/wheezing/rhonchi  	  Cardiovascular: S1, S2, regular, grII/VI murmur	    Gastrointestinal: BS + x 4Q, nontender	    Genitourinary:  Bladder nondistended, nontender    Musculoskeletal:   General Right:   no muscle/joint tenderness,   normal tone, no joint swelling,   ROM: full	    General Left:   no muscle/joint tenderness,   normal tone, no joint swelling,   ROM: limited    Hip:  Left:     Lower extrems:   Right: no calf tenderness              negative faith's sign               + pedal pulses    Left:   no calf tenderness              negative faith's sign               + pedal pulses                               8.9    12.24 )-----------( 194      ( 2021 07:47 )             27.6           135  |  103  |  19  ----------------------------<  121<H>  4.3   |  24  |  0.73    Ca    8.4<L>      2021 07:47    TPro  x   /  Alb  3.1<L>  /  TBili  x   /  DBili  x   /  AST  x   /  ALT  x   /  AlkPhos  x   -18      PT/INR - ( 2021 06:04 )   PT: 13.0 sec;   INR: 1.13 ratio         PTT - ( 2021 06:04 )  PTT:24.7 sec                 11.1   10.93 )-----------( 229      ( 2021 06:04 )             33.8       18    133<L>  |  103  |  22  ----------------------------<  111<H>  4.4   |  28  |  0.53    Ca    8.0<L>      2021 06:04    TPro  x   /  Alb  3.1<L>  /  TBili  x   /  DBili  x   /  AST  x   /  ALT  x   /  AlkPhos  x   18      PT/INR - ( 2021 06:04 )   PT: 13.0 sec;   INR: 1.13 ratio         PTT - ( 2021 06:04 )  PTT:24.7 sec				  MEDICATIONS  (STANDING):  acetaminophen   Tablet .. 975 milliGRAM(s) Oral every 8 hours  amLODIPine   Tablet 5 milliGRAM(s) Oral daily  ascorbic acid 500 milliGRAM(s) Oral two times a day  atorvastatin 10 milliGRAM(s) Oral at bedtime  budesonide 160 MICROgram(s)/formoterol 4.5 MICROgram(s) Inhaler 2 Puff(s) Inhalation two times a day  enoxaparin Injectable 40 milliGRAM(s) SubCutaneous every 24 hours  fluticasone propionate 50 MICROgram(s)/spray Nasal Spray 1 Spray(s) Both Nostrils two times a day  folic acid 1 milliGRAM(s) Oral daily  lactated ringers. 1000 milliLiter(s) IV Continuous <Continuous>  linaclotide 290 MICROGram(s) Oral before breakfast  montelukast 10 milliGRAM(s) Oral at bedtime  multivitamin 1 Tablet(s) Oral daily  pantoprazole    Tablet 40 milliGRAM(s) Oral daily  senna 2 Tablet(s) Oral at bedtime  sodium chloride 0.9%. 1000 milliLiter(s) IV Continuous <Continuous>      **Current DVT Prophylaxis:    LMWH                   ( post-op )  Heparin SQ           (  )  Coumadin             (  )  Xarelto                  (  )  Eliquis                   (  )  Venodynes           (x  )  Ambulation          ( x )  UFH                       (  )  ECASA                   (  )  Contraindicated  (  )          
HPI:  Patient is a 89y old  Female who presents with a chief complaint of S/p mechanical fall    Consulted by Dr. Garcia for VTE prophylaxis, risk stratification, and anticoagulation management.    PAST MEDICAL & SURGICAL HISTORY:  Asthma    HTN (hypertension)    Dyslipidemia    S/P knee replacement  left    Interval History:  : Patient seen at bedside pre-op. Daughter is permitted to remain at bedside due to Italian speaking only and dialect uninterpreted via [place] . Daughter relays that he Mom lives with her and was going to sit down in chair and slipped forward as she was sitting. Denies any head trauma.   Explained my role with anticoagulation and discussed necessity for Lovenox post-op for four weeks. Daughter is a  and has no issues giving injection "As long as you go over it with me again." Other Daughter will also be teaming up to help with Mother's care at home. They do not want to take her to rehab.   2021 Pt seen at bedside this afternoon with daughter present oob in chair having lunch.  Discussed with daughter the use of Lovenox as her anticoagulation medication.  She states her sister is a  and she will administer Lovenox  No concerns nd v/u the need.  21Pt seen at bedside on [facility].  Differnt daughter here today the .  She states sheis having second thought about taking her mother home because she has observed the care that is need and they may not be able to provide this care.  Will discuss with the discharge planner tomorrow about other options to send her mother to rehab.  She was also concerned about h/h 8.8/6.7.  I made her aware we will cont to monitor.  BMI: 30.9    CrCl: 803    ebl: 50ml    Caprini VTE Risk Score:  CAPRINI SCORE  AGE RELATED RISK FACTORS                                                       MOBILITY RELATED FACTORS  [ ] Age 41-60 years                                            (1 Point)                  [ ] Bed rest /restricted mobility                      (1 Point)  [ ] Age: 61-74 years                                           (2 Points)                [ ] Plaster cast                                                   (2 Points)  [x ] Age= 75 years                                              (3 Points)                 [ ] Bed bound for more than 72 hours                   (2 Points)    DISEASE RELATED RISK FACTORS                                               GENDER SPECIFIC FACTORS  [ ] Edema in the lower extremities                       (1 Point)           [ ] Pregnancy                                                            (1 Point)  [ ] Varicose veins                                               (1 Point)                  [ ] Post-partum < 6 weeks                                      (1 Point)             [x ] BMI > 25 Kg/m2                                            (1 Point)                  [ ] Hormonal therapy or oral contraception       (1 Point)                 [ ] Sepsis (in the previous month)                        (1 Point)             [ ] History of pregnancy complications                (1Point)  [ ] Pneumonia or serious lung disease                                             [ ] Unexplained or recurrent  (=/>3), premature                                 (In the previous month)                               (1 Point)                birth with toxemia or growth-restricted infant (1 Point)  [ ] Abnormal pulmonary function test            (1 Point)                                   SURGERY RELATED RISK FACTORS  [ ] Acute myocardial infarction                       (1 Point)                  [ ]  Section                                         (1 Point)  [ ] Congestive heart failure (in the previous month) (1 Point)   [ ] Minor surgery   lasting <45 minutes       (1 Point)   [ ] Inflammatory bowel disease                             (1 Point)          [ ] Arthroscopic surgery                                  (2 Points)  [ ] Central venous access                                    (2 Points)            [ ] General surgery lasting >45 minutes      (2 Points)       [ ] Stroke (in the previous month)                  (5 Points)            [ ] Elective major lower extremity arthroplasty (5 Points)                                   [  ] Malignancy (present or past include skin melanoma                                          but exclude  basal skin cell)    (2 points)                                      TRAUMA RELATED RISK FACTORS                HEMATOLOGY RELATED FACTORS                                  [x ] Fracture of the hip, pelvis, or leg                       (5 Points)  [ ] Prior episodes of VTE                                     (3 Points)          [ ] Acute spinal cord injury (in the previous month)  (5 Points)  [ ] Positive family history for VTE                         (3 Points)       [ ] Paralysis (less than 1 month)                          (5 Points)  [ ] Prothrombin 64528 A                                      (3 Points)         [ ] Multiple Trauma (within 1month)                 (5Points)                                                                                                                                                                [ ] Factor V Leiden                                          (3 Points)                                OTHER RISK FACTORS                          [ ] Lupus anticoagulants                                     (3 Points)                     [ ] BMI > 40                          (1 Point)                                                         [ ] Anticardiolipin antibodies                                (3 Points)                 [ ] Smoking                              (1Point)                                                [ ] High homocysteine in the blood                      (3 Points)                [  ] Diabetes requiring insulin (1point)                         [ ] Other congenital or acquired thrombophilia       (3 Points)          [  ] Chemotherapy                   (1 Point)  [ ] Heparin induced thrombocytopenia                  (3 Points)             [  ] Blood Transfusion                (1 point)                                                                                                             Total Score [  9        ]                                                                                                                                                                                                                                       IMPROVE Bleeding Risk Score:3.5      Falls Risk:   High ( x )  Mod (  )  Low (  )      FAMILY HISTORY:  No pertinent family history in first degree relatives      Denies any personal or familial history of clotting or bleeding disorders.    Allergies    codeine (Unknown)    Intolerances        REVIEW OF SYSTEMS    (  )Fever	        (  )Constipation	(  )SOB				  (  )Headache   (  )Dysuria  (  )Chills	        (  )Melena	        (  )Dyspnea on exertion (  )Dizziness    (  )Polyuria  (  )Nausea      (  )Hematochezia	(  )Cough                          (  )Syncope      (  )Hematuria  (  )Vomiting   (  )Chest Pain	        (  )Wheezing			  (  )Weakness  (  )Diarrhea    (  )Palpitations	(  )Anorexia			  (x  )Myalgia       (  x ) Arthralgia    Pertinent positives in HPI and daily subjective. All other systems negative.    Vital Signs Last 24 Hrs  T(C): 36.3 (21 @ 09:33), Max: 37 (21 @ 19:44)  T(F): 97.3 (21 @ 09:33), Max: 98.6 (21 @ 19:44)  HR: 74 (21 @ 09:33) (73 - 78)  BP: 108/56 (21 @ 09:33) (108/56 - 134/55)  BP(mean): --  RR: 16 (21 @ 09:33) (16 - 16)  SpO2: 97% (21 @ 09:33) (94% - 97%)      PHYSICAL EXAM:    Constitutional: Appears Well    Neurological: A& O x 3    Skin: Warm    Respiratory and Thorax: normal effort; Breath sounds: normal; No rales/wheezing/rhonchi  	  Cardiovascular: S1, S2, regular, grII/VI murmur	    Gastrointestinal: BS + x 4Q, nontender	    Genitourinary:  Bladder nondistended, nontender    Musculoskeletal:   General Right:   no muscle/joint tenderness,   normal tone, no joint swelling,   ROM: full	    General Left:   no muscle/joint tenderness,   normal tone, no joint swelling,   ROM: limited    Hip:  Left: dsg DI small about of dried drainage noted.     Lower extrems:   Right: no calf tenderness              negative faith's sign               + pedal pulses    Left:   no calf tenderness              negative faith's sign               + pedal pulses                                        8.8    10.20 )-----------( 204      ( 2021 06:21 )             26.7       06-20    134<L>  |  103  |  23  ----------------------------<  110<H>  4.4   |  27  |  0.66    Ca    8.3<L>      2021 06:21                     8.9    12.24 )-----------( 194      ( 2021 07:47 )             27.6       06-19    135  |  103  |  19  ----------------------------<  121<H>  4.3   |  24  |  0.73    Ca    8.4<L>      2021 07:47    TPro  x   /  Alb  3.1<L>  /  TBili  x   /  DBili  x   /  AST  x   /  ALT  x   /  AlkPhos  x   06-18      PT/INR - ( 2021 06:04 )   PT: 13.0 sec;   INR: 1.13 ratio         PTT - ( 2021 06:04 )  PTT:24.7 sec                 11.1   10.93 )-----------( 229      ( 2021 06:04 )             33.8       06-18    133<L>  |  103  |  22  ----------------------------<  111<H>  4.4   |  28  |  0.53    Ca    8.0<L>      2021 06:04    TPro  x   /  Alb  3.1<L>  /  TBili  x   /  DBili  x   /  AST  x   /  ALT  x   /  AlkPhos  x   06-18      PT/INR - ( 2021 06:04 )   PT: 13.0 sec;   INR: 1.13 ratio         PTT - ( 2021 06:04 )  PTT:24.7 sec				  MEDICATIONS  (STANDING):  acetaminophen   Tablet .. 975 milliGRAM(s) Oral every 8 hours  amLODIPine   Tablet 5 milliGRAM(s) Oral daily  ascorbic acid 500 milliGRAM(s) Oral two times a day  atorvastatin 10 milliGRAM(s) Oral at bedtime  budesonide 160 MICROgram(s)/formoterol 4.5 MICROgram(s) Inhaler 2 Puff(s) Inhalation two times a day  enoxaparin Injectable 40 milliGRAM(s) SubCutaneous every 24 hours  fluticasone propionate 50 MICROgram(s)/spray Nasal Spray 1 Spray(s) Both Nostrils two times a day  folic acid 1 milliGRAM(s) Oral daily  lactated ringers. 1000 milliLiter(s) IV Continuous <Continuous>  linaclotide 290 MICROGram(s) Oral before breakfast  montelukast 10 milliGRAM(s) Oral at bedtime  multivitamin 1 Tablet(s) Oral daily  pantoprazole    Tablet 40 milliGRAM(s) Oral daily  senna 2 Tablet(s) Oral at bedtime  sodium chloride 0.9%. 1000 milliLiter(s) IV Continuous <Continuous>      **Current DVT Prophylaxis:    LMWH                   ( post-op )  Heparin SQ           (  )  Coumadin             (  )  Xarelto                  (  )  Eliquis                   (  )  Venodynes           (x  )  Ambulation          ( x )  UFH                       (  )  ECASA                   (  )  Contraindicated  (  )          
HPI:  Patient is a 89y old  Female who presents with a chief complaint of S/p mechanical fall    Consulted by Dr. Garcia for VTE prophylaxis, risk stratification, and anticoagulation management.    PAST MEDICAL & SURGICAL HISTORY:  Asthma    HTN (hypertension)    Dyslipidemia    S/P knee replacement  left    Interval History:  : Patient seen at bedside pre-op. Daughter is permitted to remain at bedside due to [place] speaking only and dialect uninterpreted via [place] . Daughter relays that he Mom lives with her and was going to sit down in chair and slipped forward as she was sitting. Denies any head trauma.   Explained my role with anticoagulation and discussed necessity for Lovenox post-op for four weeks. Daughter is a  and has no issues giving injection "As long as you go over it with me again." Other Daughter will also be teaming up to help with Mother's care at home. They do not want to take her to rehab.   2021 Pt seen at bedside this afternoon with daughter present oob in chair having lunch.  Discussed with daughter the use of Lovenox as her anticoagulation medication.  She states her sister is a  and she will administer Lovenox  No concerns nd v/u the need.  21Pt seen at bedside on [facility].  Differnt daughter here today the .  She states sheis having second thought about taking her mother home because she has observed the care that is need and they may not be able to provide this care.  Will discuss with the discharge planner tomorrow about other options to send her mother to rehab.  She was also concerned about h/h 8.8/6.7.  I made her aware we will cont to monitor.  21: Patient seen at bedside OOB to chair with daughter present at bedside. She reports her Mom took about 5 steps with walker then with dizziness had to return to chair.    BMI: 30.9    CrCl: 803    ebl: 50ml    Caprini VTE Risk Score:  CAPRINI SCORE  AGE RELATED RISK FACTORS                                                       MOBILITY RELATED FACTORS  [ ] Age 41-60 years                                            (1 Point)                  [ ] Bed rest /restricted mobility                      (1 Point)  [ ] Age: 61-74 years                                           (2 Points)                [ ] Plaster cast                                                   (2 Points)  [x ] Age= 75 years                                              (3 Points)                 [ ] Bed bound for more than 72 hours                   (2 Points)    DISEASE RELATED RISK FACTORS                                               GENDER SPECIFIC FACTORS  [ ] Edema in the lower extremities                       (1 Point)           [ ] Pregnancy                                                            (1 Point)  [ ] Varicose veins                                               (1 Point)                  [ ] Post-partum < 6 weeks                                      (1 Point)             [x ] BMI > 25 Kg/m2                                            (1 Point)                  [ ] Hormonal therapy or oral contraception       (1 Point)                 [ ] Sepsis (in the previous month)                        (1 Point)             [ ] History of pregnancy complications                (1Point)  [ ] Pneumonia or serious lung disease                                             [ ] Unexplained or recurrent  (=/>3), premature                                 (In the previous month)                               (1 Point)                birth with toxemia or growth-restricted infant (1 Point)  [ ] Abnormal pulmonary function test            (1 Point)                                   SURGERY RELATED RISK FACTORS  [ ] Acute myocardial infarction                       (1 Point)                  [ ]  Section                                         (1 Point)  [ ] Congestive heart failure (in the previous month) (1 Point)   [ ] Minor surgery   lasting <45 minutes       (1 Point)   [ ] Inflammatory bowel disease                             (1 Point)          [ ] Arthroscopic surgery                                  (2 Points)  [ ] Central venous access                                    (2 Points)            [ ] General surgery lasting >45 minutes      (2 Points)       [ ] Stroke (in the previous month)                  (5 Points)            [ ] Elective major lower extremity arthroplasty (5 Points)                                   [  ] Malignancy (present or past include skin melanoma                                          but exclude  basal skin cell)    (2 points)                                      TRAUMA RELATED RISK FACTORS                HEMATOLOGY RELATED FACTORS                                  [x ] Fracture of the hip, pelvis, or leg                       (5 Points)  [ ] Prior episodes of VTE                                     (3 Points)          [ ] Acute spinal cord injury (in the previous month)  (5 Points)  [ ] Positive family history for VTE                         (3 Points)       [ ] Paralysis (less than 1 month)                          (5 Points)  [ ] Prothrombin 54674 A                                      (3 Points)         [ ] Multiple Trauma (within 1month)                 (5Points)                                                                                                                                                                [ ] Factor V Leiden                                          (3 Points)                                OTHER RISK FACTORS                          [ ] Lupus anticoagulants                                     (3 Points)                     [ ] BMI > 40                          (1 Point)                                                         [ ] Anticardiolipin antibodies                                (3 Points)                 [ ] Smoking                              (1Point)                                                [ ] High homocysteine in the blood                      (3 Points)                [  ] Diabetes requiring insulin (1point)                         [ ] Other congenital or acquired thrombophilia       (3 Points)          [  ] Chemotherapy                   (1 Point)  [ ] Heparin induced thrombocytopenia                  (3 Points)             [  ] Blood Transfusion                (1 point)                                                                                                             Total Score [  9        ]                                                                                                                                                                                                                                       IMPROVE Bleeding Risk Score:3.5      Falls Risk:   High ( x )  Mod (  )  Low (  )      FAMILY HISTORY:  No pertinent family history in first degree relatives      Denies any personal or familial history of clotting or bleeding disorders.    Allergies    codeine (Unknown)    Intolerances        REVIEW OF SYSTEMS    (  )Fever	        (  )Constipation	(  )SOB				  (  )Headache   (  )Dysuria  (  )Chills	        (  )Melena	        (  )Dyspnea on exertion (  )Dizziness    (  )Polyuria  (  )Nausea      (  )Hematochezia	(  )Cough                          (  )Syncope      (  )Hematuria  (  )Vomiting   (  )Chest Pain	        (  )Wheezing			  (  )Weakness  (  )Diarrhea    (  )Palpitations	(  )Anorexia			  (x  )Myalgia       (  x ) Arthralgia    Pertinent positives in HPI and daily subjective. All other systems negative.    Vital Signs Last 24 Hrs  T(C): 37.1 (21 @ 09:17), Max: 37.6 (21 @ 19:51)  T(F): 98.8 (21 @ 09:17), Max: 99.7 (21 @ 19:51)  HR: 68 (21 @ 09:17) (68 - 81)  BP: 133/62 (21 @ 09:17) (121/59 - 133/62)  BP(mean): 78 (21 @ 09:17) (78 - 78)  RR: 18 (21 @ 09:17) (18 - 18)  SpO2: 94% (21 @ 09:17) (94% - 98%)      PHYSICAL EXAM:    Constitutional: Appears Well    Neurological: A& O x 3    Skin: Warm    Respiratory and Thorax: normal effort; Breath sounds: normal; No rales/wheezing/rhonchi  	  Cardiovascular: S1, S2, regular, grII/VI murmur	    Gastrointestinal: BS + x 4Q, nontender	    Genitourinary:  Bladder nondistended, nontender    Musculoskeletal:   General Right:   no muscle/joint tenderness,   normal tone, no joint swelling,   ROM: full	    General Left:   no muscle/joint tenderness,   normal tone, no joint swelling,   ROM: limited    Hip:  Left: dressing with moderate old blood and diffuse ecchymosis tracking into groin and posteriorly to glute    Lower extrems:   Right: no calf tenderness              negative faith's sign               + pedal pulses    Left:   no calf tenderness              negative faith's sign               + pedal pulses                          9.1    10.01 )-----------( 249      ( 2021 10:06 )             28.3       06-21    137  |  106  |  16  ----------------------------<  99  4.5   |  29  |  0.44<L>    Ca    8.4<L>      2021 06:24                                                8.8    10.20 )-----------( 204      ( 2021 06:21 )             26.7       06-20    134<L>  |  103  |  23  ----------------------------<  110<H>  4.4   |  27  |  0.66    Ca    8.3<L>      2021 06:21                     8.9    12.24 )-----------( 194      ( 2021 07:47 )             27.6       06-19    135  |  103  |  19  ----------------------------<  121<H>  4.3   |  24  |  0.73    Ca    8.4<L>      2021 07:47    TPro  x   /  Alb  3.1<L>  /  TBili  x   /  DBili  x   /  AST  x   /  ALT  x   /  AlkPhos  x   06-18      PT/INR - ( 2021 06:04 )   PT: 13.0 sec;   INR: 1.13 ratio         PTT - ( 2021 06:04 )  PTT:24.7 sec                 11.1   10.93 )-----------( 229      ( 2021 06:04 )             33.8       06-18    133<L>  |  103  |  22  ----------------------------<  111<H>  4.4   |  28  |  0.53    Ca    8.0<L>      2021 06:04    TPro  x   /  Alb  3.1<L>  /  TBili  x   /  DBili  x   /  AST  x   /  ALT  x   /  AlkPhos  x   06-18      PT/INR - ( 2021 06:04 )   PT: 13.0 sec;   INR: 1.13 ratio         PTT - ( 2021 06:04 )  PTT:24.7 sec				  MEDICATIONS  (STANDING):  acetaminophen   Tablet .. 975 milliGRAM(s) Oral every 8 hours  amLODIPine   Tablet 5 milliGRAM(s) Oral daily  ascorbic acid 500 milliGRAM(s) Oral two times a day  atorvastatin 10 milliGRAM(s) Oral at bedtime  budesonide 160 MICROgram(s)/formoterol 4.5 MICROgram(s) Inhaler 2 Puff(s) Inhalation two times a day  enoxaparin Injectable 40 milliGRAM(s) SubCutaneous every 24 hours  fluticasone propionate 50 MICROgram(s)/spray Nasal Spray 1 Spray(s) Both Nostrils two times a day  folic acid 1 milliGRAM(s) Oral daily  lactated ringers. 1000 milliLiter(s) IV Continuous <Continuous>  linaclotide 290 MICROGram(s) Oral before breakfast  montelukast 10 milliGRAM(s) Oral at bedtime  multivitamin 1 Tablet(s) Oral daily  pantoprazole    Tablet 40 milliGRAM(s) Oral daily  senna 2 Tablet(s) Oral at bedtime  sodium chloride 0.9%. 1000 milliLiter(s) IV Continuous <Continuous>      **Current DVT Prophylaxis:    LMWH                   ( x )  Heparin SQ           (  )  Coumadin             (  )  Xarelto                  (  )  Eliquis                   (  )  Venodynes           (x  )  Ambulation          ( x )  UFH                       (  )  ECASA                   (  )  Contraindicated  (  )

## 2021-06-22 RX ORDER — TRAMADOL HYDROCHLORIDE 50 MG/1
1 TABLET ORAL
Qty: 12 | Refills: 0
Start: 2021-06-22 | End: 2021-07-16

## 2021-06-22 RX ORDER — TRAMADOL HYDROCHLORIDE 50 MG/1
1 TABLET ORAL
Qty: 28 | Refills: 0
Start: 2021-06-22 | End: 2021-06-28

## 2021-06-29 DIAGNOSIS — G89.29 OTHER CHRONIC PAIN: ICD-10-CM

## 2021-06-29 DIAGNOSIS — M54.9 DORSALGIA, UNSPECIFIED: ICD-10-CM

## 2021-06-29 DIAGNOSIS — Z96.652 PRESENCE OF LEFT ARTIFICIAL KNEE JOINT: ICD-10-CM

## 2021-06-29 DIAGNOSIS — K59.00 CONSTIPATION, UNSPECIFIED: ICD-10-CM

## 2021-06-29 DIAGNOSIS — D62 ACUTE POSTHEMORRHAGIC ANEMIA: ICD-10-CM

## 2021-06-29 DIAGNOSIS — E78.5 HYPERLIPIDEMIA, UNSPECIFIED: ICD-10-CM

## 2021-06-29 DIAGNOSIS — M19.90 UNSPECIFIED OSTEOARTHRITIS, UNSPECIFIED SITE: ICD-10-CM
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
## 2021-06-29 DIAGNOSIS — F03.90 UNSPECIFIED DEMENTIA, UNSPECIFIED SEVERITY, WITHOUT BEHAVIORAL DISTURBANCE, PSYCHOTIC DISTURBANCE, MOOD DISTURBANCE, AND ANXIETY: ICD-10-CM

## 2021-06-29 DIAGNOSIS — J45.909 UNSPECIFIED ASTHMA, UNCOMPLICATED: ICD-10-CM

## 2021-06-29 DIAGNOSIS — Z87.891 PERSONAL HISTORY OF NICOTINE DEPENDENCE: ICD-10-CM

## 2021-06-29 DIAGNOSIS — S72.142A DISPLACED INTERTROCHANTERIC FRACTURE OF LEFT FEMUR, INITIAL ENCOUNTER FOR CLOSED FRACTURE: ICD-10-CM

## 2021-06-29 DIAGNOSIS — Z88.8 ALLERGY STATUS TO OTHER DRUGS, MEDICAMENTS AND BIOLOGICAL SUBSTANCES: ICD-10-CM

## 2021-06-29 DIAGNOSIS — I10 ESSENTIAL (PRIMARY) HYPERTENSION: ICD-10-CM

## 2021-10-22 ENCOUNTER — EMERGENCY (EMERGENCY)
Facility: HOSPITAL | Age: 86
LOS: 0 days | Discharge: HOME | End: 2021-10-22
Attending: EMERGENCY MEDICINE
Payer: MEDICARE

## 2021-10-22 VITALS
RESPIRATION RATE: 18 BRPM | HEART RATE: 75 BPM | HEIGHT: 62 IN | DIASTOLIC BLOOD PRESSURE: 72 MMHG | OXYGEN SATURATION: 98 % | SYSTOLIC BLOOD PRESSURE: 147 MMHG | TEMPERATURE: 98 F | WEIGHT: 158.95 LBS

## 2021-10-22 VITALS
OXYGEN SATURATION: 97 % | TEMPERATURE: 98 F | SYSTOLIC BLOOD PRESSURE: 144 MMHG | DIASTOLIC BLOOD PRESSURE: 69 MMHG | RESPIRATION RATE: 16 BRPM | HEART RATE: 70 BPM

## 2021-10-22 DIAGNOSIS — J45.909 UNSPECIFIED ASTHMA, UNCOMPLICATED: ICD-10-CM

## 2021-10-22 DIAGNOSIS — R11.2 NAUSEA WITH VOMITING, UNSPECIFIED: ICD-10-CM

## 2021-10-22 DIAGNOSIS — Z96.652 PRESENCE OF LEFT ARTIFICIAL KNEE JOINT: ICD-10-CM

## 2021-10-22 DIAGNOSIS — R63.0 ANOREXIA: ICD-10-CM

## 2021-10-22 DIAGNOSIS — R53.1 WEAKNESS: ICD-10-CM

## 2021-10-22 DIAGNOSIS — R10.12 LEFT UPPER QUADRANT PAIN: ICD-10-CM

## 2021-10-22 DIAGNOSIS — I10 ESSENTIAL (PRIMARY) HYPERTENSION: ICD-10-CM

## 2021-10-22 DIAGNOSIS — E78.5 HYPERLIPIDEMIA, UNSPECIFIED: ICD-10-CM

## 2021-10-22 DIAGNOSIS — Z88.5 ALLERGY STATUS TO NARCOTIC AGENT: ICD-10-CM

## 2021-10-22 DIAGNOSIS — Z96.659 PRESENCE OF UNSPECIFIED ARTIFICIAL KNEE JOINT: Chronic | ICD-10-CM

## 2021-10-22 DIAGNOSIS — R06.02 SHORTNESS OF BREATH: ICD-10-CM

## 2021-10-22 LAB
ALBUMIN SERPL ELPH-MCNC: 3.5 G/DL — SIGNIFICANT CHANGE UP (ref 3.3–5)
ALP SERPL-CCNC: 158 U/L — HIGH (ref 40–120)
ALT FLD-CCNC: 22 U/L — SIGNIFICANT CHANGE UP (ref 12–78)
ANION GAP SERPL CALC-SCNC: 6 MMOL/L — SIGNIFICANT CHANGE UP (ref 5–17)
APPEARANCE UR: CLEAR — SIGNIFICANT CHANGE UP
AST SERPL-CCNC: 39 U/L — HIGH (ref 15–37)
BASOPHILS # BLD AUTO: 0.03 K/UL — SIGNIFICANT CHANGE UP (ref 0–0.2)
BASOPHILS NFR BLD AUTO: 0.5 % — SIGNIFICANT CHANGE UP (ref 0–2)
BILIRUB SERPL-MCNC: 0.6 MG/DL — SIGNIFICANT CHANGE UP (ref 0.2–1.2)
BILIRUB UR-MCNC: NEGATIVE — SIGNIFICANT CHANGE UP
BUN SERPL-MCNC: 10 MG/DL — SIGNIFICANT CHANGE UP (ref 7–23)
CALCIUM SERPL-MCNC: 9.3 MG/DL — SIGNIFICANT CHANGE UP (ref 8.5–10.1)
CHLORIDE SERPL-SCNC: 102 MMOL/L — SIGNIFICANT CHANGE UP (ref 96–108)
CO2 SERPL-SCNC: 26 MMOL/L — SIGNIFICANT CHANGE UP (ref 22–31)
COLOR SPEC: YELLOW — SIGNIFICANT CHANGE UP
CREAT SERPL-MCNC: 0.4 MG/DL — LOW (ref 0.5–1.3)
DIFF PNL FLD: ABNORMAL
EOSINOPHIL # BLD AUTO: 0.01 K/UL — SIGNIFICANT CHANGE UP (ref 0–0.5)
EOSINOPHIL NFR BLD AUTO: 0.2 % — SIGNIFICANT CHANGE UP (ref 0–6)
GLUCOSE SERPL-MCNC: 98 MG/DL — SIGNIFICANT CHANGE UP (ref 70–99)
GLUCOSE UR QL: NEGATIVE MG/DL — SIGNIFICANT CHANGE UP
HCT VFR BLD CALC: 42.3 % — SIGNIFICANT CHANGE UP (ref 34.5–45)
HGB BLD-MCNC: 14 G/DL — SIGNIFICANT CHANGE UP (ref 11.5–15.5)
IMM GRANULOCYTES NFR BLD AUTO: 0.3 % — SIGNIFICANT CHANGE UP (ref 0–1.5)
KETONES UR-MCNC: ABNORMAL
LEUKOCYTE ESTERASE UR-ACNC: NEGATIVE — SIGNIFICANT CHANGE UP
LIDOCAIN IGE QN: 78 U/L — SIGNIFICANT CHANGE UP (ref 73–393)
LYMPHOCYTES # BLD AUTO: 1.44 K/UL — SIGNIFICANT CHANGE UP (ref 1–3.3)
LYMPHOCYTES # BLD AUTO: 22.9 % — SIGNIFICANT CHANGE UP (ref 13–44)
MCHC RBC-ENTMCNC: 28 PG — SIGNIFICANT CHANGE UP (ref 27–34)
MCHC RBC-ENTMCNC: 33.1 GM/DL — SIGNIFICANT CHANGE UP (ref 32–36)
MCV RBC AUTO: 84.6 FL — SIGNIFICANT CHANGE UP (ref 80–100)
MONOCYTES # BLD AUTO: 0.35 K/UL — SIGNIFICANT CHANGE UP (ref 0–0.9)
MONOCYTES NFR BLD AUTO: 5.6 % — SIGNIFICANT CHANGE UP (ref 2–14)
NEUTROPHILS # BLD AUTO: 4.43 K/UL — SIGNIFICANT CHANGE UP (ref 1.8–7.4)
NEUTROPHILS NFR BLD AUTO: 70.5 % — SIGNIFICANT CHANGE UP (ref 43–77)
NITRITE UR-MCNC: NEGATIVE — SIGNIFICANT CHANGE UP
PH UR: 7 — SIGNIFICANT CHANGE UP (ref 5–8)
PLATELET # BLD AUTO: 319 K/UL — SIGNIFICANT CHANGE UP (ref 150–400)
POTASSIUM SERPL-MCNC: 4.3 MMOL/L — SIGNIFICANT CHANGE UP (ref 3.5–5.3)
POTASSIUM SERPL-SCNC: 4.3 MMOL/L — SIGNIFICANT CHANGE UP (ref 3.5–5.3)
PROT SERPL-MCNC: 7.9 GM/DL — SIGNIFICANT CHANGE UP (ref 6–8.3)
PROT UR-MCNC: 15 MG/DL
RBC # BLD: 5 M/UL — SIGNIFICANT CHANGE UP (ref 3.8–5.2)
RBC # FLD: 15.3 % — HIGH (ref 10.3–14.5)
SODIUM SERPL-SCNC: 134 MMOL/L — LOW (ref 135–145)
SP GR SPEC: 1.01 — SIGNIFICANT CHANGE UP (ref 1.01–1.02)
UROBILINOGEN FLD QL: NEGATIVE MG/DL — SIGNIFICANT CHANGE UP
WBC # BLD: 6.28 K/UL — SIGNIFICANT CHANGE UP (ref 3.8–10.5)
WBC # FLD AUTO: 6.28 K/UL — SIGNIFICANT CHANGE UP (ref 3.8–10.5)

## 2021-10-22 PROCEDURE — 87086 URINE CULTURE/COLONY COUNT: CPT

## 2021-10-22 PROCEDURE — 99284 EMERGENCY DEPT VISIT MOD MDM: CPT

## 2021-10-22 PROCEDURE — 85025 COMPLETE CBC W/AUTO DIFF WBC: CPT

## 2021-10-22 PROCEDURE — 96374 THER/PROPH/DIAG INJ IV PUSH: CPT

## 2021-10-22 PROCEDURE — 36415 COLL VENOUS BLD VENIPUNCTURE: CPT

## 2021-10-22 PROCEDURE — 83690 ASSAY OF LIPASE: CPT

## 2021-10-22 PROCEDURE — 96375 TX/PRO/DX INJ NEW DRUG ADDON: CPT

## 2021-10-22 PROCEDURE — 80053 COMPREHEN METABOLIC PANEL: CPT

## 2021-10-22 PROCEDURE — 81001 URINALYSIS AUTO W/SCOPE: CPT

## 2021-10-22 PROCEDURE — 99284 EMERGENCY DEPT VISIT MOD MDM: CPT | Mod: 25

## 2021-10-22 RX ORDER — FAMOTIDINE 10 MG/ML
20 INJECTION INTRAVENOUS ONCE
Refills: 0 | Status: COMPLETED | OUTPATIENT
Start: 2021-10-22 | End: 2021-10-22

## 2021-10-22 RX ORDER — ONDANSETRON 8 MG/1
4 TABLET, FILM COATED ORAL ONCE
Refills: 0 | Status: COMPLETED | OUTPATIENT
Start: 2021-10-22 | End: 2021-10-22

## 2021-10-22 RX ORDER — SODIUM CHLORIDE 9 MG/ML
500 INJECTION INTRAMUSCULAR; INTRAVENOUS; SUBCUTANEOUS ONCE
Refills: 0 | Status: COMPLETED | OUTPATIENT
Start: 2021-10-22 | End: 2021-10-22

## 2021-10-22 RX ADMIN — FAMOTIDINE 20 MILLIGRAM(S): 10 INJECTION INTRAVENOUS at 21:26

## 2021-10-22 RX ADMIN — SODIUM CHLORIDE 500 MILLILITER(S): 9 INJECTION INTRAMUSCULAR; INTRAVENOUS; SUBCUTANEOUS at 21:27

## 2021-10-22 RX ADMIN — ONDANSETRON 4 MILLIGRAM(S): 8 TABLET, FILM COATED ORAL at 21:27

## 2021-10-22 NOTE — ED PROVIDER NOTE - PATIENT PORTAL LINK FT
You can access the FollowMyHealth Patient Portal offered by [facility] by registering at the following website: [email or web address]. By joining [employer]’s FollowMyHealth portal, you will also be able to view your health information using other applications (apps) compatible with our system.

## 2021-10-22 NOTE — ED PROVIDER NOTE - MUSCULOSKELETAL, MLM
Spine appears normal, range of motion is not limited, no muscle or joint tenderness LONDON X4. No focal swelling tenderness.

## 2021-10-22 NOTE — ED ADULT NURSE NOTE - OBJECTIVE STATEMENT
pt presents to the ED with nausea, vomiting that began 2 days ago. pt states that it started after taking to flue shot from PCP. denies fevers, chills.

## 2021-10-22 NOTE — ED PROVIDER NOTE - GASTROINTESTINAL, MLM
Abdomen soft, non-tender, no guarding. Hypoactive bowel sounds. Normal pitch. minimal tenderness LUQ. No other focal tenderness.

## 2021-10-22 NOTE — ED PROVIDER NOTE - OBJECTIVE STATEMENT
89 y/o Maori speaking female with a PMHx of HTN, asthma, knee replacement presents to the ED c/o nausea after receiving the flu shot 2 days ago. States she had an upset stomach. Per daughter after lunch pt had 3 episodes of vomiting and 4 episodes today. Feels cramps before throwing up.  +flatus. Reports that there have been a decrease in BM but  did so today. +SOB. No fever. Allergic to Codeine. 89 y/o Slovak speaking female with a PMHx of HTN, asthma, knee replacement presents to the ED c/o nausea after receiving the flu shot 2 days ago. States she had an upset stomach. Per daughter after lunch yesterday, pt had 3 episodes of vomiting and 4 episodes earlier today. Feels cramps immediately before throwing up, but otherwise no abd pain.  +flatus. Reports that there have been a decrease in BM but had 2 today. No fever. Allergic to Codeine.

## 2021-10-22 NOTE — ED PROVIDER NOTE - ENMT, MLM
Airway patent, Nasal mucosa clear. Mouth with  mildly dry mucosa. Throat has no vesicles, no oropharyngeal clear exudates and uvula is midline. OP clear.

## 2021-10-22 NOTE — ED PROVIDER NOTE - PROGRESS NOTE DETAILS
KITTY Dawson MD:  Informed by ED RN that pt reports feeling much improved, no further N/V, no abd pain, + tolerated po fluids & crackers well.  Labs WNL.  Abdomen NT on re-examination.  Pt stable for D/c home, daughter verbally instructed to emphasize oral fluids, avoid fried foods, heavy meals, dairy products next few days.

## 2021-10-22 NOTE — ED PROVIDER NOTE - NSFOLLOWUPINSTRUCTIONS_ED_ALL_ED_FT
Rest.  Drink more oral fluids frequently.  Avoid heavy meals, fried foods, ALL dairy products.  This may have been related to the recent flu shot.  Follow up early next week with your own doctor.  Return to ED if nausea/vomiting recurs/worsens, abdominal pain, fever, shortness of breath, or other concern.      Acute Nausea and Vomiting    WHAT YOU NEED TO KNOW:    Acute nausea and vomiting start suddenly, worsen quickly, and last a short time.    DISCHARGE INSTRUCTIONS:    Return to the emergency department if:   •You see blood in your vomit or your bowel movements.      •You have sudden, severe pain in your chest and upper abdomen after hard vomiting or retching.      •You have swelling in your neck and chest.       •You are dizzy, cold, and thirsty and your eyes and mouth are dry.      •You are urinating very little or not at all.      •You have muscle weakness, leg cramps, and trouble breathing.       •Your heart is beating much faster than normal.       •You continue to vomit for more than 48 hours.       Contact your healthcare provider if:   •You have frequent dry heaves (vomiting but nothing comes out).      •Your nausea and vomiting does not get better or go away after you use medicine.      •You have questions or concerns about your condition or treatment.      Medicines: You may need any of the following:   •Medicines may be given to calm your stomach and stop your vomiting. You may also need medicines to help you feel more relaxed or to stop nausea and vomiting caused by motion sickness.      •Gastrointestinal stimulants are used to help empty your stomach and bowels. This may help decrease nausea and vomiting.      •Take your medicine as directed. Contact your healthcare provider if you think your medicine is not helping or if you have side effects. Tell him or her if you are allergic to any medicine. Keep a list of the medicines, vitamins, and herbs you take. Include the amounts, and when and why you take them. Bring the list or the pill bottles to follow-up visits. Carry your medicine list with you in case of an emergency.      Prevent or manage acute nausea and vomiting:   •Do not drink alcohol. Alcohol may upset or irritate your stomach. Too much alcohol can also cause acute nausea and vomiting.      •Control stress. Headaches due to stress may cause nausea and vomiting. Find ways to relax and manage your stress. Get more rest and sleep.      •Drink more liquids as directed. Vomiting can lead to dehydration. It is important to drink more liquids to help replace lost body fluids. Ask your healthcare provider how much liquid to drink each day and which liquids are best for you. Your provider may recommend that you drink an oral rehydration solution (ORS). ORS contains water, salts, and sugar that are needed to replace the lost body fluids. Ask what kind of ORS to use, how much to drink, and where to get it.      •Eat smaller meals, more often. Eat small amounts of food every 2 to 3 hours, even if you are not hungry. Food in your stomach may decrease your nausea.      •Talk to your healthcare provider before you take over-the-counter (OTC) medicines. These medicines can cause serious problems if you use certain other medicines, or you have a medical condition. You may have problems if you use too much or use them for longer than the label says. Follow directions on the label carefully.       Follow up with your healthcare provider as directed: Write down your questions so you remember to ask them during your follow-up visits.

## 2021-10-22 NOTE — ED PROVIDER NOTE - CONSTITUTIONAL, MLM
normal... [place] elderly female in no respiratory distress, mildly ill  appearing but non-toxic , awake, alert, oriented to person, place, time/situation and in no apparent distress. Croatian elderly female in no respiratory distress, alert, mildly ill  appearing but non-toxic

## 2021-10-22 NOTE — ED ADULT TRIAGE NOTE - AS HEIGHT TYPE
[facility] Short Visit Progress Note    89 Pt admit to [facility]  ambulatory in stable condition. Assessment completed. No new concerns voiced. Peripheral IV established in the LEFT AC without difficulty. Medications ordered. Patient Vitals for the past 12 hrs:   Temp Pulse Resp BP SpO2   11/18/19 1440 98.7 °F (37.1 °C) 91 18 134/84 97 %             Medications Administered     0.9% sodium chloride infusion     Admin Date  11/18/2019 Action  New Bag Dose  25 mL/hr Rate  25 mL/hr Route  IntraVENous Administered By  Manuel Hidalgo          ferric carboxymaltose (INJECTAFER) 750 mg in 0.9% sodium chloride 250 mL, overfill volume 25 mL IVPB     Admin Date  11/18/2019 Action  Given Dose  750 mg Rate  870 mL/hr Route  IntraVENous Administered By  Manuel Hidalgo                1640 Pt tolerated treatment well, rate decreased to 500ml/hr due to burning at insertion site. IV maintained positive blood return throughout treatment and was removed at the conclusion of therapy. D/c home ambulatory in no distress. Pt aware of next appointment.     Jenny Acharya RN stated

## 2021-10-25 LAB
CULTURE RESULTS: SIGNIFICANT CHANGE UP
SPECIMEN SOURCE: SIGNIFICANT CHANGE UP

## 2022-05-11 NOTE — ED ADULT NURSE NOTE - BREATH SOUNDS, MLM
----- Message from Nadja Bowling sent at 5/11/2022 12:15 PM CDT -----  Regarding: Returning a missed call  Pt's mother called and stated she missed a call. She also stated she was trying to get the pt's US orders sent to [place] and left messages last week. She is not sure if the call is concerning that but that has been taken care of.      Thank You  Soledad KILLIAN      Clear

## 2022-07-13 NOTE — PATIENT PROFILE ADULT - NSPRESCRUSEDDRG_GEN_A_NUR
CONSULTATION NOTE - INFECTIOUS DISEASES          Mr. Matos is evaluated today at the request of Dr. Mabel Maldonado MD .  Reason for consult wounds.      CHIEF COMPLAINT     Chief Complaint   Patient presents with   • Wound Infection   • Hypotension          HISTORY OF PRESENT ILLNESS     Mr. Matos is a 84 year old male with history of coronary artery disease, CHF, hyperlipidemia, hypertension, diabetes, history of fall resulting in left upper chest and left elbow wounds now comes in due to concern for wound infection and hypotension.  Patient denies any fevers, he denies any significant drainage or pain at the wounds site.  He is asking to be discharged home.        REVIEW OF SYSTEMS     Review of Systems   - 14 Point ROS discussed with the patient in detail. Pertinent positive findings are documented in the HPI.  The rest is unremarkable.         HISTORIES     Past Medical History:   Diagnosis Date   • CAD (coronary artery disease)    • Congestive cardiac failure (CMS/LTAC, located within [facility])    • Gastroesophageal reflux disease    • Hyperlipidemia with target LDL less than 70    • Hypertension, essential    • Presence of aortocoronary bypass graft 2014   • Stroke (CMS/HCC)     December 2020   • Thyroid condition    • Type 2 diabetes mellitus (CMS/LTAC, located within [facility])    • UTI (urinary tract infection)        Past Surgical History:   Procedure Laterality Date   • Coronary artery bypass graft      2014-CABG x2   • Tonsillectomy         Family History   Problem Relation Age of Onset   • Diabetes Mother    • Pacemaker Father    • Pacemaker Brother    • Coronary Artery Disease Sister    • Heart disease Sister        Social History     Socioeconomic History   • Marital status:      Spouse name: Not on file   • Number of children: 2   • Years of education: Not on file   • Highest education level: Not on file   Occupational History   • Occupation: retired   Tobacco Use   • Smoking status: Never Smoker   • Smokeless tobacco: Never Used    Substance and Sexual Activity   • Alcohol use: Yes     Comment: occ    • Drug use: No   • Sexual activity: Not on file   Other Topics Concern   •  Service Not Asked   • Blood Transfusions Not Asked   • Caffeine Concern Yes     Comment: 1 cup of tea   • Occupational Exposure Not Asked   • Hobby Hazards Not Asked   • Sleep Concern Not Asked   • Stress Concern Not Asked   • Weight Concern Not Asked   • Special Diet Not Asked   • Back Care Not Asked   • Exercise No   • Bike Helmet Not Asked   • Seat Belt Not Asked   • Self-Exams Not Asked   Social History Narrative    Lives alone     Social Determinants of Health     Financial Resource Strain: Not on file   Food Insecurity: Not on file   Transportation Needs: Not on file   Physical Activity: Not on file   Stress: Not on file   Social Connections: Not on file   Intimate Partner Violence: Not At Risk   • Social Determinants: Intimate Partner Violence Past Fear: No   • Social Determinants: Intimate Partner Violence Current Fear: No          ALLERGIES     ALLERGIES:  Sitagliptin      MEDICATIONS     Current Facility-Administered Medications   Medication Dose Route Frequency Provider Last Rate Last Admin   • hydrALAZINE (APRESOLINE) tablet 50 mg  50 mg Oral Q8H PRN Mabel Maldonado MD       • lactated ringers bolus 1,000 mL  1,000 mL Intravenous Once Sha Doss MD       • cefTRIAXone (ROCEPHIN) syringe 1,000 mg  1,000 mg Intravenous Daily Valentine Pretty MD   1,000 mg at 07/13/22 1227   • VANCOMYCIN - PHARMACIST MONITORED Misc   Does not apply See Admin Instructions Valentine Pretty MD       • acetaminophen (TYLENOL) tablet 650 mg  650 mg Oral Q6H PRN Valentine Pretty MD       • ondansetron (ZOFRAN) injection 4 mg  4 mg Intravenous Q6H PRN Valentine Pretty MD       • dextrose 50 % injection 25 g  25 g Intravenous PRN Valentine Pretty MD       • dextrose 50 % injection 12.5 g  12.5 g Intravenous PRN Valentine Pretty MD       • glucagon  (GLUCAGEN) injection 1 mg  1 mg Intramuscular PRN Valentine Pretty MD       • dextrose (GLUTOSE) 40 % gel 15 g  15 g Oral PRN Valentine Pretty MD       • dextrose (GLUTOSE) 40 % gel 30 g  30 g Oral PRN Valentine Pretty MD       • insulin lispro (ADMELOG,HumaLOG) - Correction Dose   Subcutaneous TID WC Valentine Pretty MD       • insulin lispro (ADMELOG,HumaLOG) - Correction Dose   Subcutaneous Nightly Valentine Pretty MD       • AMIODarone (PACERONE) tablet 200 mg  200 mg Oral Daily Valentine Pretty MD   200 mg at 07/13/22 0928   • atorvastatin (LIPITOR) tablet 40 mg  40 mg Oral Daily Valentine Pretty MD   40 mg at 07/13/22 0928   • gabapentin (NEURONTIN) capsule 300 mg  300 mg Oral TID Valentine Pretty MD   300 mg at 07/13/22 0928   • insulin glargine (LANTUS) injection 20 Units  20 Units Subcutaneous Nightly Valentine Pretty MD   20 Units at 07/12/22 2157   • levothyroxine (SYNTHROID, LEVOTHROID) tablet 50 mcg  50 mcg Oral QAM AC Valentine Pretty MD   50 mcg at 07/13/22 0554   • metoPROLOL succinate (TOPROL-XL) ER tablet 50 mg  50 mg Oral Daily Valentine Pretty MD   50 mg at 07/13/22 0928   • pantoprazole (PROTONIX) EC tablet 40 mg  40 mg Oral Daily Valentine Pretty MD   40 mg at 07/13/22 0928   • tamsulosin (FLOMAX) capsule 0.4 mg  0.4 mg Oral Daily PC Valentine Pretty MD   0.4 mg at 07/13/22 0928   • [START ON 7/14/2022] vancomycin (VANCOCIN) 750 mg in sodium chloride 0.9 % 250 mL IVPB  750 mg Intravenous Q24H Valentine Pretty MD             Physical Exam     Vitals with min/max:      Vital Last Value 24 Hour Range   Temperature 97.5 °F (36.4 °C) (07/13/22 1327) Temp  Min: 97.5 °F (36.4 °C)  Max: 98.4 °F (36.9 °C)   Pulse (!) 53 (07/13/22 1327) Pulse  Min: 50  Max: 58   Respiratory 20 (07/13/22 1327) Resp  Min: 17  Max: 20   Non-Invasive  Blood Pressure 109/63 (07/13/22 1327) BP  Min: 106/58  Max: 163/79   Pulse Oximetry 95 % (07/13/22 1327) SpO2  Min: 92 %  Max: 98 %    Arterial   Blood Pressure   No data recorded     Physical Exam  Vitals and nursing note reviewed.   Constitutional:       General: He is not in acute distress.     Appearance: Normal appearance.   HENT:      Head: Normocephalic and atraumatic.      Mouth/Throat:      Mouth: Mucous membranes are moist.      Pharynx: No oropharyngeal exudate or posterior oropharyngeal erythema.      Neck: Normal range of motion and neck supple. No rigidity. No muscular tenderness.   Eyes:      General: No scleral icterus.     Extraocular Movements: Extraocular movements intact.      Conjunctiva/sclera: Conjunctivae normal.      Pupils: Pupils are equal, round, and reactive to light.   Cardiovascular:      Rate and Rhythm: Normal rate and regular rhythm.      Heart sounds: Normal heart sounds. No murmur heard.  Pulmonary:      Effort: Pulmonary effort is normal. No respiratory distress.      Breath sounds: Normal breath sounds. No wheezing or rales.   Chest:      Chest wall: No tenderness.   Abdominal:      General: Abdomen is flat. Bowel sounds are normal. There is no distension.      Palpations: Abdomen is soft.      Tenderness: There is no abdominal tenderness.   Musculoskeletal:         General: No swelling or tenderness. Normal range of motion.      Right lower leg: No edema.      Left lower leg: No edema.   Lymphadenopathy:      Cervical: No cervical adenopathy.   Skin:     General: Skin is warm and dry.      Findings: No rash.      Comments: Left upper chest wound appears to be clean there are some hypergranulation tissue.  Left elbow wound is quite small.   Neurological:      General: No focal deficit present.      Mental Status: He is alert and oriented to person, place, and time. Mental status is at baseline.      Cranial Nerves: No cranial nerve deficit.      Sensory: No sensory deficit.      Motor: No weakness.   Psychiatric:         Mood and Affect: Mood normal.         Behavior: Behavior normal.             Labs      Recent Labs   Lab 07/13/22  0609 07/12/22  1548   SODIUM 141 138   POTASSIUM 3.8 4.9   CHLORIDE 108 104   CO2 25 27   ANIONGAP 12 12   BUN 21* 25*   CREATININE 1.18* 1.48*   CALCIUM 7.9* 8.3*   GLUCOSE 121* 235*     Recent Labs   Lab 07/13/22  0609 07/12/22  1548   BILIRUBIN 0.3 0.4   ALBUMIN 2.6* 3.1*   ALKPT 120* 138*   GPT 21 28   AST 16 23     Recent Labs   Lab 07/13/22  0609   MG 1.7     Recent Labs   Lab 07/13/22  0609   WBC 7.1   RBC 3.42*   HGB 11.5*   HCT 35.7*        No results found  No results found  Hemoglobin A1C (%)   Date Value   05/27/2022 8.8 (H)       Cultures:   Microbiology Results  (Last 10 results in the past 7 days)    Specimen   Gram Smear   Culture Result   Status       07/12/22  1548         Gram positive cocci in clusters.  Comment: Detected from aerobic bottle after 17 hours.  [P]                 [P] - Preliminary Result              UA:  Recent Labs   Lab 05/27/22  0733   COL Yellow   UAPP Cloudy   UGLU Negative   USPG 1.015   URBC Small*   UPH 6.0   UPROT 30 *   UNITR Negative   UWBC Large*   SEPT 1 to 5         Imaging   CT HEAD WO CONTRAST  Narrative: CT HEAD WO CONTRAST   TECHNIQUE: CT of the brain was performed without intravenous contrast.  Adjustment of the mA and/or kV was performed based on the patient's size to  minimize radiation dose.    CLINICAL INDICATION: Mental status change, unknown cause  altered mental statusMental status change    COMPARISON: 05/03/2022    FINDINGS:     Extra axial spaces: Normal in size and morphology for the patient's age.   Atrophy.  Hemorrhage: None.  Ventricular system: Normal in size and morphology for the patient's age.  Basal cisterns: Normal.  Cerebral parenchyma: Moderate periventricular and subcortical white matter  low density nonspecific but commonly associated with chronic small vessel  ischemia related to microvascular disease.  Midline shift: None.  Cerebellum: Old infarct in the a right inferior cerebellum  Brainstem:  Normal.    OTHER:    Calvarium: Normal.  Visualized Paranasal sinuses: Clear.  Visualized Orbits: Normal.  Visualized upper cervical spine: Normal.  Sella and skull base: Normal.  Impression: There is no evidence for acute intracranial abnormality.  Findings similar  to the previous study of the 05/03/2022.  Read full report.         Electronically Signed by: AIDAN MONTANO MD   Signed on: 5/27/2022 8:28 AM   XR CHEST PA OR AP 1 VIEW  Narrative: Procedure: XR CHEST PA OR AP 1 VIEW    COMPARISON: 05/03/2022.    INDICATIONS: Chest Pain    TECHNIQUES: Portable chest x-ray at 0616 hours were obtained.  Impression: RESULTS/IMPRESSION: Again noted are post-CABG changes, mildly to moderately  large cardiac size and ectatic aorta.  The lungs are mildly hyper aerated.   Small linear opacity in the right infrahilar region is likely atelectasis  other possible evolving infiltration cannot be excluded.  The rest of the  lungs is clear.  No pneumothorax is seen.  Right CP angle is clear.  There  is suggestion of small left pleural effusion.  Follow-up is recommended.    Electronically Signed by: SHANNA MOSCOSO MD   Signed on: 5/27/2022 7:07 AM            IMPRESSION     Mr. Matos is a 84 year old male with history of coronary artery disease, CHF, hyperlipidemia, hypertension, diabetes, history of fall resulting in left upper chest and left elbow wounds now comes in due to concern for wound infection and hypotension.    His wounds appear to be clean, there is no evidence of infection.  Blood culture 1/2 is positive for staphylococcal species suspect this will be contamination as this is not a staff aureus or staph epi.        RECOMMENDATIONS     No indication for IV antibiotics at this time.  Wound care recommendations.  Discussed with PCP.            Thank you for asking us to participate in the care of your patient.    Dina Hunt MD  7/13/2022 2:45 PM     No

## 2022-10-03 NOTE — ED ADULT TRIAGE NOTE - HEIGHT IN CM
157.48
no dermatitis, no environmental allergies, no food allergies, no immunosuppressive disorder, and no pruritus.

## 2024-08-15 ENCOUNTER — EMERGENCY (EMERGENCY)
Facility: HOSPITAL | Age: 89
LOS: 0 days | Discharge: HOME | End: 2024-08-15
Attending: EMERGENCY MEDICINE
Payer: MEDICARE

## 2024-08-15 VITALS
RESPIRATION RATE: 17 BRPM | DIASTOLIC BLOOD PRESSURE: 77 MMHG | HEART RATE: 77 BPM | OXYGEN SATURATION: 95 % | SYSTOLIC BLOOD PRESSURE: 136 MMHG | TEMPERATURE: 98 F

## 2024-08-15 VITALS
DIASTOLIC BLOOD PRESSURE: 68 MMHG | HEART RATE: 78 BPM | OXYGEN SATURATION: 94 % | TEMPERATURE: 98 F | RESPIRATION RATE: 17 BRPM | SYSTOLIC BLOOD PRESSURE: 149 MMHG

## 2024-08-15 DIAGNOSIS — R11.2 NAUSEA WITH VOMITING, UNSPECIFIED: ICD-10-CM

## 2024-08-15 DIAGNOSIS — Z88.5 ALLERGY STATUS TO NARCOTIC AGENT: ICD-10-CM

## 2024-08-15 DIAGNOSIS — Z96.659 PRESENCE OF UNSPECIFIED ARTIFICIAL KNEE JOINT: Chronic | ICD-10-CM

## 2024-08-15 LAB
ALBUMIN SERPL ELPH-MCNC: 3.8 G/DL — SIGNIFICANT CHANGE UP (ref 3.3–5)
ALP SERPL-CCNC: 118 U/L — SIGNIFICANT CHANGE UP (ref 40–120)
ALT FLD-CCNC: 21 U/L — SIGNIFICANT CHANGE UP (ref 12–78)
ANION GAP SERPL CALC-SCNC: 6 MMOL/L — SIGNIFICANT CHANGE UP (ref 5–17)
APTT BLD: 27.1 SEC — SIGNIFICANT CHANGE UP (ref 24.5–35.6)
AST SERPL-CCNC: 19 U/L — SIGNIFICANT CHANGE UP (ref 15–37)
BASOPHILS # BLD AUTO: 0.06 K/UL — SIGNIFICANT CHANGE UP (ref 0–0.2)
BASOPHILS NFR BLD AUTO: 1 % — SIGNIFICANT CHANGE UP (ref 0–2)
BILIRUB SERPL-MCNC: 0.8 MG/DL — SIGNIFICANT CHANGE UP (ref 0.2–1.2)
BLD GP AB SCN SERPL QL: SIGNIFICANT CHANGE UP
BUN SERPL-MCNC: 17 MG/DL — SIGNIFICANT CHANGE UP (ref 7–23)
CALCIUM SERPL-MCNC: 9.9 MG/DL — SIGNIFICANT CHANGE UP (ref 8.5–10.1)
CHLORIDE SERPL-SCNC: 106 MMOL/L — SIGNIFICANT CHANGE UP (ref 96–108)
CO2 SERPL-SCNC: 26 MMOL/L — SIGNIFICANT CHANGE UP (ref 22–31)
CREAT SERPL-MCNC: 0.6 MG/DL — SIGNIFICANT CHANGE UP (ref 0.5–1.3)
EGFR: 82 ML/MIN/1.73M2 — SIGNIFICANT CHANGE UP
EOSINOPHIL # BLD AUTO: 0.04 K/UL — SIGNIFICANT CHANGE UP (ref 0–0.5)
EOSINOPHIL NFR BLD AUTO: 0.7 % — SIGNIFICANT CHANGE UP (ref 0–6)
GLUCOSE SERPL-MCNC: 141 MG/DL — HIGH (ref 70–99)
HCT VFR BLD CALC: 39.7 % — SIGNIFICANT CHANGE UP (ref 34.5–45)
HGB BLD-MCNC: 13.2 G/DL — SIGNIFICANT CHANGE UP (ref 11.5–15.5)
IMM GRANULOCYTES NFR BLD AUTO: 0.2 % — SIGNIFICANT CHANGE UP (ref 0–0.9)
INR BLD: 1.09 RATIO — SIGNIFICANT CHANGE UP (ref 0.85–1.18)
LIDOCAIN IGE QN: 28 U/L — SIGNIFICANT CHANGE UP (ref 13–75)
LYMPHOCYTES # BLD AUTO: 1.03 K/UL — SIGNIFICANT CHANGE UP (ref 1–3.3)
LYMPHOCYTES # BLD AUTO: 17.4 % — SIGNIFICANT CHANGE UP (ref 13–44)
MCHC RBC-ENTMCNC: 28.7 PG — SIGNIFICANT CHANGE UP (ref 27–34)
MCHC RBC-ENTMCNC: 33.2 GM/DL — SIGNIFICANT CHANGE UP (ref 32–36)
MCV RBC AUTO: 86.3 FL — SIGNIFICANT CHANGE UP (ref 80–100)
MONOCYTES # BLD AUTO: 0.21 K/UL — SIGNIFICANT CHANGE UP (ref 0–0.9)
MONOCYTES NFR BLD AUTO: 3.5 % — SIGNIFICANT CHANGE UP (ref 2–14)
NEUTROPHILS # BLD AUTO: 4.57 K/UL — SIGNIFICANT CHANGE UP (ref 1.8–7.4)
NEUTROPHILS NFR BLD AUTO: 77.2 % — HIGH (ref 43–77)
PLATELET # BLD AUTO: 255 K/UL — SIGNIFICANT CHANGE UP (ref 150–400)
POTASSIUM SERPL-MCNC: 3.8 MMOL/L — SIGNIFICANT CHANGE UP (ref 3.5–5.3)
POTASSIUM SERPL-SCNC: 3.8 MMOL/L — SIGNIFICANT CHANGE UP (ref 3.5–5.3)
PROT SERPL-MCNC: 8.2 GM/DL — SIGNIFICANT CHANGE UP (ref 6–8.3)
PROTHROM AB SERPL-ACNC: 12.3 SEC — SIGNIFICANT CHANGE UP (ref 9.5–13)
RBC # BLD: 4.6 M/UL — SIGNIFICANT CHANGE UP (ref 3.8–5.2)
RBC # FLD: 14.2 % — SIGNIFICANT CHANGE UP (ref 10.3–14.5)
SODIUM SERPL-SCNC: 138 MMOL/L — SIGNIFICANT CHANGE UP (ref 135–145)
WBC # BLD: 5.92 K/UL — SIGNIFICANT CHANGE UP (ref 3.8–10.5)
WBC # FLD AUTO: 5.92 K/UL — SIGNIFICANT CHANGE UP (ref 3.8–10.5)

## 2024-08-15 PROCEDURE — 85025 COMPLETE CBC W/AUTO DIFF WBC: CPT

## 2024-08-15 PROCEDURE — 36415 COLL VENOUS BLD VENIPUNCTURE: CPT

## 2024-08-15 PROCEDURE — 80053 COMPREHEN METABOLIC PANEL: CPT

## 2024-08-15 PROCEDURE — 99285 EMERGENCY DEPT VISIT HI MDM: CPT

## 2024-08-15 PROCEDURE — 85610 PROTHROMBIN TIME: CPT

## 2024-08-15 PROCEDURE — 85730 THROMBOPLASTIN TIME PARTIAL: CPT

## 2024-08-15 PROCEDURE — 74177 CT ABD & PELVIS W/CONTRAST: CPT | Mod: MC

## 2024-08-15 PROCEDURE — 74177 CT ABD & PELVIS W/CONTRAST: CPT | Mod: 26,MC

## 2024-08-15 PROCEDURE — 86901 BLOOD TYPING SEROLOGIC RH(D): CPT

## 2024-08-15 PROCEDURE — 83690 ASSAY OF LIPASE: CPT

## 2024-08-15 PROCEDURE — 86850 RBC ANTIBODY SCREEN: CPT

## 2024-08-15 PROCEDURE — 99284 EMERGENCY DEPT VISIT MOD MDM: CPT | Mod: 25

## 2024-08-15 PROCEDURE — 86900 BLOOD TYPING SEROLOGIC ABO: CPT

## 2024-08-15 PROCEDURE — 36000 PLACE NEEDLE IN VEIN: CPT | Mod: XU

## 2024-08-15 RX ORDER — ONDANSETRON HCL/PF 4 MG/2 ML
1 VIAL (ML) INJECTION
Qty: 9 | Refills: 0
Start: 2024-08-15 | End: 2024-08-17

## 2024-08-15 NOTE — ED ADULT NURSE NOTE - NSFALLHARMRISKINTERV_ED_ALL_ED

## 2024-08-15 NOTE — ED ADULT NURSE NOTE - OBJECTIVE STATEMENT
Pt is here with her grandson for vomiting today and pain from vomiting. Pt sent from urgent care to rule out infection. pt does not appear in acute distress. 20 G IV inserted. Lab specimen obtained and sent to lab.

## 2024-08-15 NOTE — ED ADULT NURSE NOTE - CHIEF COMPLAINT QUOTE
Pt TREMAINE grandson, sent to ED by urgent care. Pt c/o abdominal pain and N/V starting today. Given 8mg PO Zofran by urgent care, unrelieved. HX of dementia, HTN, HDL.

## 2024-08-15 NOTE — ED PROVIDER NOTE - CLINICAL SUMMARY MEDICAL DECISION MAKING FREE TEXT BOX
ct negative pt tolerating po fluids acting appropriate per grandson. return to ED for intractable pain fevers any overall worsening pt and pts grandson agree to plan of care

## 2024-08-15 NOTE — ED ADULT NURSE NOTE - NSFALLLASTDATE_ED_ALL_ED_DT
"Subjective:      S:  Vika Mckeon is a 21 y.o.  female  @ EGA: 21w4d JANELL: Estimated Date of Delivery: 18  per  who presents for her new OB exam.  She reports recent nosebleeds.  Desires AFP.  Declines CF.  Reports positive FM, no VB, LOF, or cramping.  Denies dysuria, vaginal DC.  Pt is  and lives with FOB. Works in .  Pregnancy is desired.  Declines Centering Pregnancy.    O:    Vitals:    18 1514   BP: (!) 98/56   Weight: 53.5 kg (118 lb)   Height: 1.6 m (5' 3\")    See H&P Prenatal Physical.  Wet mount: not indicated        FHTs: 155        Fundal ht: 21cm     A:   1.  IUP @ 21w4d JANELL: Estimated Date of Delivery: 18 per          2.  S=D        3.    Patient Active Problem List    Diagnosis Date Noted   • Encounter for supervision of normal pregnancy in second trimester 2018   • Rubella equivocal 2014         P:  1.  GC/CT dsone. Pap done.         2.  Prenatal labs ordered - lab slip given        3.  Discussed PNV, diet, and adequate water intake        4.  NOB packet given        5.  Return to office in 4 wks        6.  Complete OB US asap        7.  Ocean spray and humidifier for nose bleeds        HPI    Review of Systems   Constitutional: Negative.    HENT: Negative.    Eyes: Negative.    Respiratory: Negative.    Cardiovascular: Negative.    Gastrointestinal: Negative.    Genitourinary: Negative.         Uterus enlarged   Musculoskeletal: Negative.    Skin: Negative.    Neurological: Negative.    Endo/Heme/Allergies: Negative.    Psychiatric/Behavioral: Negative.           Objective:     BP (!) 98/56   Ht 1.6 m (5' 3\")   Wt 53.5 kg (118 lb)   BMI 20.90 kg/m²      Physical Exam   Constitutional: She is oriented to person, place, and time. She appears well-developed and well-nourished.   HENT:   Head: Normocephalic and atraumatic.   Eyes: Pupils are equal, round, and reactive to light.   Neck: Normal range of motion. Neck supple. "   Cardiovascular: Normal rate, regular rhythm and normal heart sounds.    Pulmonary/Chest: Effort normal and breath sounds normal.   Abdominal: Soft.   Genitourinary: Vagina normal and uterus normal.   Musculoskeletal: Normal range of motion.   Neurological: She is alert and oriented to person, place, and time. She has normal reflexes.   Skin: Skin is warm and dry.   Psychiatric: She has a normal mood and affect. Her behavior is normal. Judgment and thought content normal.   Nursing note and vitals reviewed.              Assessment/Plan:     1. Encounter for supervision of other normal pregnancy in second trimester    - US-OB 2ND 3RD TRI COMPLETE; Future  - THINPREP RFLX HPV ASCUS W/CTNG; Future  - PREG CNTR PRENATAL PN; Future  - INFLUENZA VACCINE QUAD INJ >3Y(PF)       15-Aug-2024 19:07

## 2024-08-15 NOTE — ED PROVIDER NOTE - OBJECTIVE STATEMENT
Pt TREMAINE kendall, sent to ED by urgent care. Pt c/o abdominal pain and N/V starting today. Abdominal pain constant aching nonradiating nonbloody nonbilious nausea and vomiting no melena or hematochezia no hematemesis no diarrhea no fevers no chest pain or shortness of breath no urinary frequency urgency or dysuria no vaginal bleeding or vaginal discharge no motor or sensory deficits no prior treatment no recent high risk travel no positive sick contacts

## 2024-08-15 NOTE — ED PROVIDER NOTE - PATIENT PORTAL LINK FT
You can access the FollowMyHealth Patient Portal offered by [facility] by registering at the following website: [email or web address]. By joining [facility]’s FollowMyHealth portal, you will also be able to view your health information using other applications (apps) compatible with our system.

## 2024-09-14 ENCOUNTER — EMERGENCY (EMERGENCY)
Facility: HOSPITAL | Age: 88
LOS: 0 days | Discharge: HOME | End: 2024-09-14
Attending: STUDENT IN AN ORGANIZED HEALTH CARE EDUCATION/TRAINING PROGRAM
Payer: MEDICARE

## 2024-09-14 VITALS
SYSTOLIC BLOOD PRESSURE: 157 MMHG | DIASTOLIC BLOOD PRESSURE: 78 MMHG | TEMPERATURE: 98 F | RESPIRATION RATE: 18 BRPM | OXYGEN SATURATION: 99 % | HEART RATE: 62 BPM

## 2024-09-14 VITALS
SYSTOLIC BLOOD PRESSURE: 200 MMHG | OXYGEN SATURATION: 98 % | DIASTOLIC BLOOD PRESSURE: 76 MMHG | TEMPERATURE: 97 F | RESPIRATION RATE: 18 BRPM | HEART RATE: 51 BPM

## 2024-09-14 DIAGNOSIS — Z88.5 ALLERGY STATUS TO NARCOTIC AGENT: ICD-10-CM

## 2024-09-14 DIAGNOSIS — Z96.659 PRESENCE OF UNSPECIFIED ARTIFICIAL KNEE JOINT: Chronic | ICD-10-CM

## 2024-09-14 DIAGNOSIS — J45.909 UNSPECIFIED ASTHMA, UNCOMPLICATED: ICD-10-CM

## 2024-09-14 DIAGNOSIS — F03.90 UNSPECIFIED DEMENTIA, UNSPECIFIED SEVERITY, WITHOUT BEHAVIORAL DISTURBANCE, PSYCHOTIC DISTURBANCE, MOOD DISTURBANCE, AND ANXIETY: ICD-10-CM

## 2024-09-14 DIAGNOSIS — E78.5 HYPERLIPIDEMIA, UNSPECIFIED: ICD-10-CM

## 2024-09-14 DIAGNOSIS — R11.10 VOMITING, UNSPECIFIED: ICD-10-CM

## 2024-09-14 DIAGNOSIS — R11.2 NAUSEA WITH VOMITING, UNSPECIFIED: ICD-10-CM

## 2024-09-14 DIAGNOSIS — R00.1 BRADYCARDIA, UNSPECIFIED: ICD-10-CM

## 2024-09-14 DIAGNOSIS — I10 ESSENTIAL (PRIMARY) HYPERTENSION: ICD-10-CM

## 2024-09-14 LAB
ALBUMIN SERPL ELPH-MCNC: 4 G/DL — SIGNIFICANT CHANGE UP (ref 3.3–5)
ALP SERPL-CCNC: 121 U/L — HIGH (ref 40–120)
ALT FLD-CCNC: 19 U/L — SIGNIFICANT CHANGE UP (ref 12–78)
ANION GAP SERPL CALC-SCNC: 7 MMOL/L — SIGNIFICANT CHANGE UP (ref 5–17)
APPEARANCE UR: CLEAR — SIGNIFICANT CHANGE UP
APTT BLD: 28.3 SEC — SIGNIFICANT CHANGE UP (ref 24.5–35.6)
AST SERPL-CCNC: 18 U/L — SIGNIFICANT CHANGE UP (ref 15–37)
BASOPHILS # BLD AUTO: 0.05 K/UL — SIGNIFICANT CHANGE UP (ref 0–0.2)
BASOPHILS NFR BLD AUTO: 0.9 % — SIGNIFICANT CHANGE UP (ref 0–2)
BILIRUB SERPL-MCNC: 1.1 MG/DL — SIGNIFICANT CHANGE UP (ref 0.2–1.2)
BILIRUB UR-MCNC: NEGATIVE — SIGNIFICANT CHANGE UP
BUN SERPL-MCNC: 12 MG/DL — SIGNIFICANT CHANGE UP (ref 7–23)
CALCIUM SERPL-MCNC: 9.5 MG/DL — SIGNIFICANT CHANGE UP (ref 8.5–10.1)
CHLORIDE SERPL-SCNC: 98 MMOL/L — SIGNIFICANT CHANGE UP (ref 96–108)
CO2 SERPL-SCNC: 26 MMOL/L — SIGNIFICANT CHANGE UP (ref 22–31)
COLOR SPEC: YELLOW — SIGNIFICANT CHANGE UP
CREAT SERPL-MCNC: 0.65 MG/DL — SIGNIFICANT CHANGE UP (ref 0.5–1.3)
DIFF PNL FLD: ABNORMAL
EGFR: 81 ML/MIN/1.73M2 — SIGNIFICANT CHANGE UP
EGFR: 81 ML/MIN/1.73M2 — SIGNIFICANT CHANGE UP
EOSINOPHIL # BLD AUTO: 0.08 K/UL — SIGNIFICANT CHANGE UP (ref 0–0.5)
EOSINOPHIL NFR BLD AUTO: 1.4 % — SIGNIFICANT CHANGE UP (ref 0–6)
GLUCOSE SERPL-MCNC: 119 MG/DL — HIGH (ref 70–99)
GLUCOSE UR QL: NEGATIVE MG/DL — SIGNIFICANT CHANGE UP
HCT VFR BLD CALC: 41.9 % — SIGNIFICANT CHANGE UP (ref 34.5–45)
HGB BLD-MCNC: 14.1 G/DL — SIGNIFICANT CHANGE UP (ref 11.5–15.5)
IMM GRANULOCYTES NFR BLD AUTO: 0.2 % — SIGNIFICANT CHANGE UP (ref 0–0.9)
INR BLD: 1.02 RATIO — SIGNIFICANT CHANGE UP (ref 0.85–1.18)
KETONES UR-MCNC: 15 MG/DL
LEUKOCYTE ESTERASE UR-ACNC: NEGATIVE — SIGNIFICANT CHANGE UP
LIDOCAIN IGE QN: 31 U/L — SIGNIFICANT CHANGE UP (ref 13–75)
LYMPHOCYTES # BLD AUTO: 1.34 K/UL — SIGNIFICANT CHANGE UP (ref 1–3.3)
LYMPHOCYTES # BLD AUTO: 23 % — SIGNIFICANT CHANGE UP (ref 13–44)
MCHC RBC-ENTMCNC: 28.6 PG — SIGNIFICANT CHANGE UP (ref 27–34)
MCHC RBC-ENTMCNC: 33.7 GM/DL — SIGNIFICANT CHANGE UP (ref 32–36)
MCV RBC AUTO: 85 FL — SIGNIFICANT CHANGE UP (ref 80–100)
MONOCYTES # BLD AUTO: 0.4 K/UL — SIGNIFICANT CHANGE UP (ref 0–0.9)
MONOCYTES NFR BLD AUTO: 6.9 % — SIGNIFICANT CHANGE UP (ref 2–14)
NEUTROPHILS # BLD AUTO: 3.94 K/UL — SIGNIFICANT CHANGE UP (ref 1.8–7.4)
NEUTROPHILS NFR BLD AUTO: 67.6 % — SIGNIFICANT CHANGE UP (ref 43–77)
NITRITE UR-MCNC: NEGATIVE — SIGNIFICANT CHANGE UP
PH UR: 8 — SIGNIFICANT CHANGE UP (ref 5–8)
PLATELET # BLD AUTO: 288 K/UL — SIGNIFICANT CHANGE UP (ref 150–400)
POTASSIUM SERPL-MCNC: 3.7 MMOL/L — SIGNIFICANT CHANGE UP (ref 3.5–5.3)
POTASSIUM SERPL-SCNC: 3.7 MMOL/L — SIGNIFICANT CHANGE UP (ref 3.5–5.3)
PROT SERPL-MCNC: 8 GM/DL — SIGNIFICANT CHANGE UP (ref 6–8.3)
PROT UR-MCNC: SIGNIFICANT CHANGE UP MG/DL
PROTHROM AB SERPL-ACNC: 11.5 SEC — SIGNIFICANT CHANGE UP (ref 9.5–13)
RBC # BLD: 4.93 M/UL — SIGNIFICANT CHANGE UP (ref 3.8–5.2)
RBC # FLD: 13.3 % — SIGNIFICANT CHANGE UP (ref 10.3–14.5)
SODIUM SERPL-SCNC: 131 MMOL/L — LOW (ref 135–145)
SP GR SPEC: 1.02 — SIGNIFICANT CHANGE UP (ref 1–1.03)
TROPONIN I, HIGH SENSITIVITY RESULT: 7.34 NG/L — SIGNIFICANT CHANGE UP
UROBILINOGEN FLD QL: 0.2 MG/DL — SIGNIFICANT CHANGE UP (ref 0.2–1)
WBC # BLD: 5.82 K/UL — SIGNIFICANT CHANGE UP (ref 3.8–10.5)
WBC # FLD AUTO: 5.82 K/UL — SIGNIFICANT CHANGE UP (ref 3.8–10.5)

## 2024-09-14 PROCEDURE — 86850 RBC ANTIBODY SCREEN: CPT

## 2024-09-14 PROCEDURE — 93005 ELECTROCARDIOGRAM TRACING: CPT

## 2024-09-14 PROCEDURE — 85730 THROMBOPLASTIN TIME PARTIAL: CPT

## 2024-09-14 PROCEDURE — 96374 THER/PROPH/DIAG INJ IV PUSH: CPT | Mod: XU

## 2024-09-14 PROCEDURE — 96375 TX/PRO/DX INJ NEW DRUG ADDON: CPT

## 2024-09-14 PROCEDURE — 36415 COLL VENOUS BLD VENIPUNCTURE: CPT

## 2024-09-14 PROCEDURE — 71045 X-RAY EXAM CHEST 1 VIEW: CPT

## 2024-09-14 PROCEDURE — 70450 CT HEAD/BRAIN W/O DYE: CPT | Mod: MC

## 2024-09-14 PROCEDURE — 99285 EMERGENCY DEPT VISIT HI MDM: CPT | Mod: FS

## 2024-09-14 PROCEDURE — 74177 CT ABD & PELVIS W/CONTRAST: CPT | Mod: 26,MC

## 2024-09-14 PROCEDURE — 99285 EMERGENCY DEPT VISIT HI MDM: CPT | Mod: 25

## 2024-09-14 PROCEDURE — 84484 ASSAY OF TROPONIN QUANT: CPT

## 2024-09-14 PROCEDURE — 86900 BLOOD TYPING SEROLOGIC ABO: CPT

## 2024-09-14 PROCEDURE — 70450 CT HEAD/BRAIN W/O DYE: CPT | Mod: 26,MC

## 2024-09-14 PROCEDURE — 83690 ASSAY OF LIPASE: CPT

## 2024-09-14 PROCEDURE — 86901 BLOOD TYPING SEROLOGIC RH(D): CPT

## 2024-09-14 PROCEDURE — 81001 URINALYSIS AUTO W/SCOPE: CPT

## 2024-09-14 PROCEDURE — 85025 COMPLETE CBC W/AUTO DIFF WBC: CPT

## 2024-09-14 PROCEDURE — 93010 ELECTROCARDIOGRAM REPORT: CPT

## 2024-09-14 PROCEDURE — 71045 X-RAY EXAM CHEST 1 VIEW: CPT | Mod: 26

## 2024-09-14 PROCEDURE — 85610 PROTHROMBIN TIME: CPT

## 2024-09-14 PROCEDURE — 80053 COMPREHEN METABOLIC PANEL: CPT

## 2024-09-14 PROCEDURE — 74177 CT ABD & PELVIS W/CONTRAST: CPT | Mod: MC

## 2024-09-14 RX ORDER — AMLODIPINE BESYLATE 10 MG/1
10 TABLET ORAL ONCE
Refills: 0 | Status: COMPLETED | OUTPATIENT
Start: 2024-09-14 | End: 2024-09-14

## 2024-09-14 RX ORDER — MAG HYDROX/ALUMINUM HYD/SIMETH 200-200-20
10 SUSPENSION, ORAL (FINAL DOSE FORM) ORAL
Qty: 350 | Refills: 0
Start: 2024-09-14

## 2024-09-14 RX ORDER — ONDANSETRON HCL/PF 4 MG/2 ML
4 VIAL (ML) INJECTION ONCE
Refills: 0 | Status: COMPLETED | OUTPATIENT
Start: 2024-09-14 | End: 2024-09-14

## 2024-09-14 RX ORDER — ONDANSETRON HCL/PF 4 MG/2 ML
1 VIAL (ML) INJECTION
Qty: 20 | Refills: 0
Start: 2024-09-14

## 2024-09-14 RX ADMIN — Medication 1000 MILLILITER(S): at 14:03

## 2024-09-14 RX ADMIN — Medication 4 MILLIGRAM(S): at 14:03

## 2024-09-14 RX ADMIN — AMLODIPINE BESYLATE 10 MILLIGRAM(S): 10 TABLET ORAL at 15:14

## 2024-09-14 RX ADMIN — Medication 80 MILLIGRAM(S): at 14:03

## 2024-09-23 ENCOUNTER — INPATIENT (INPATIENT)
Facility: HOSPITAL | Age: 89
LOS: 1 days | Discharge: HOME | DRG: 641 | End: 2024-09-25
Attending: INTERNAL MEDICINE | Admitting: INTERNAL MEDICINE
Payer: MEDICARE

## 2024-09-23 VITALS
TEMPERATURE: 98 F | DIASTOLIC BLOOD PRESSURE: 75 MMHG | SYSTOLIC BLOOD PRESSURE: 137 MMHG | WEIGHT: 139.99 LBS | OXYGEN SATURATION: 97 % | HEIGHT: 63 IN | RESPIRATION RATE: 15 BRPM | HEART RATE: 67 BPM

## 2024-09-23 DIAGNOSIS — R62.7 ADULT FAILURE TO THRIVE: ICD-10-CM

## 2024-09-23 DIAGNOSIS — Z96.659 PRESENCE OF UNSPECIFIED ARTIFICIAL KNEE JOINT: Chronic | ICD-10-CM

## 2024-09-23 LAB
ALBUMIN SERPL ELPH-MCNC: 3.8 G/DL — SIGNIFICANT CHANGE UP (ref 3.3–5)
ALP SERPL-CCNC: 120 U/L — SIGNIFICANT CHANGE UP (ref 30–120)
ALT FLD-CCNC: 20 U/L — SIGNIFICANT CHANGE UP (ref 10–60)
ANION GAP SERPL CALC-SCNC: 7 MMOL/L — SIGNIFICANT CHANGE UP (ref 5–17)
APPEARANCE UR: CLEAR — SIGNIFICANT CHANGE UP
APTT BLD: 29 SEC — SIGNIFICANT CHANGE UP (ref 24.5–35.6)
AST SERPL-CCNC: 23 U/L — SIGNIFICANT CHANGE UP (ref 10–40)
BASOPHILS # BLD AUTO: 0.05 K/UL — SIGNIFICANT CHANGE UP (ref 0–0.2)
BASOPHILS NFR BLD AUTO: 0.8 % — SIGNIFICANT CHANGE UP (ref 0–2)
BILIRUB SERPL-MCNC: 1.3 MG/DL — HIGH (ref 0.2–1.2)
BILIRUB UR-MCNC: NEGATIVE — SIGNIFICANT CHANGE UP
BUN SERPL-MCNC: 12 MG/DL — SIGNIFICANT CHANGE UP (ref 7–23)
CALCIUM SERPL-MCNC: 9.7 MG/DL — SIGNIFICANT CHANGE UP (ref 8.4–10.5)
CHLORIDE SERPL-SCNC: 88 MMOL/L — LOW (ref 96–108)
CO2 SERPL-SCNC: 32 MMOL/L — HIGH (ref 22–31)
COLOR SPEC: YELLOW — SIGNIFICANT CHANGE UP
CREAT SERPL-MCNC: 0.72 MG/DL — SIGNIFICANT CHANGE UP (ref 0.5–1.3)
DIFF PNL FLD: ABNORMAL
EGFR: 76 ML/MIN/1.73M2 — SIGNIFICANT CHANGE UP
EOSINOPHIL # BLD AUTO: 0.09 K/UL — SIGNIFICANT CHANGE UP (ref 0–0.5)
EOSINOPHIL NFR BLD AUTO: 1.4 % — SIGNIFICANT CHANGE UP (ref 0–6)
ERYTHROCYTE [SEDIMENTATION RATE] IN BLOOD: 8 MM/HR — SIGNIFICANT CHANGE UP (ref 0–20)
GLUCOSE SERPL-MCNC: 97 MG/DL — SIGNIFICANT CHANGE UP (ref 70–99)
GLUCOSE UR QL: NEGATIVE MG/DL — SIGNIFICANT CHANGE UP
HCT VFR BLD CALC: 41.7 % — SIGNIFICANT CHANGE UP (ref 34.5–45)
HGB BLD-MCNC: 14.6 G/DL — SIGNIFICANT CHANGE UP (ref 11.5–15.5)
IMM GRANULOCYTES NFR BLD AUTO: 0.2 % — SIGNIFICANT CHANGE UP (ref 0–0.9)
INR BLD: 1.13 RATIO — SIGNIFICANT CHANGE UP (ref 0.85–1.18)
KETONES UR-MCNC: ABNORMAL MG/DL
LEUKOCYTE ESTERASE UR-ACNC: ABNORMAL
LIDOCAIN IGE QN: 44 U/L — SIGNIFICANT CHANGE UP (ref 16–77)
LYMPHOCYTES # BLD AUTO: 1.63 K/UL — SIGNIFICANT CHANGE UP (ref 1–3.3)
LYMPHOCYTES # BLD AUTO: 25.5 % — SIGNIFICANT CHANGE UP (ref 13–44)
MAGNESIUM SERPL-MCNC: 1.6 MG/DL — SIGNIFICANT CHANGE UP (ref 1.6–2.6)
MCHC RBC-ENTMCNC: 28.5 PG — SIGNIFICANT CHANGE UP (ref 27–34)
MCHC RBC-ENTMCNC: 35 G/DL — SIGNIFICANT CHANGE UP (ref 32–36)
MCV RBC AUTO: 81.4 FL — SIGNIFICANT CHANGE UP (ref 80–100)
MONOCYTES # BLD AUTO: 0.59 K/UL — SIGNIFICANT CHANGE UP (ref 0–0.9)
MONOCYTES NFR BLD AUTO: 9.2 % — SIGNIFICANT CHANGE UP (ref 2–14)
NEUTROPHILS # BLD AUTO: 4.01 K/UL — SIGNIFICANT CHANGE UP (ref 1.8–7.4)
NEUTROPHILS NFR BLD AUTO: 62.9 % — SIGNIFICANT CHANGE UP (ref 43–77)
NITRITE UR-MCNC: NEGATIVE — SIGNIFICANT CHANGE UP
NRBC # BLD: 0 /100 WBCS — SIGNIFICANT CHANGE UP (ref 0–0)
PH UR: 7 — SIGNIFICANT CHANGE UP (ref 5–8)
PLATELET # BLD AUTO: 285 K/UL — SIGNIFICANT CHANGE UP (ref 150–400)
POTASSIUM SERPL-MCNC: 3.3 MMOL/L — LOW (ref 3.5–5.3)
POTASSIUM SERPL-SCNC: 3.3 MMOL/L — LOW (ref 3.5–5.3)
PROT SERPL-MCNC: 7.7 G/DL — SIGNIFICANT CHANGE UP (ref 6–8.3)
PROT UR-MCNC: NEGATIVE MG/DL — SIGNIFICANT CHANGE UP
PROTHROM AB SERPL-ACNC: 12.3 SEC — SIGNIFICANT CHANGE UP (ref 9.5–13)
RBC # BLD: 5.12 M/UL — SIGNIFICANT CHANGE UP (ref 3.8–5.2)
RBC # FLD: 12.7 % — SIGNIFICANT CHANGE UP (ref 10.3–14.5)
SODIUM SERPL-SCNC: 127 MMOL/L — LOW (ref 135–145)
SP GR SPEC: 1 — LOW (ref 1–1.03)
UROBILINOGEN FLD QL: 1 MG/DL — SIGNIFICANT CHANGE UP (ref 0.2–1)
WBC # BLD: 6.38 K/UL — SIGNIFICANT CHANGE UP (ref 3.8–10.5)
WBC # FLD AUTO: 6.38 K/UL — SIGNIFICANT CHANGE UP (ref 3.8–10.5)

## 2024-09-23 PROCEDURE — 93010 ELECTROCARDIOGRAM REPORT: CPT

## 2024-09-23 PROCEDURE — 71045 X-RAY EXAM CHEST 1 VIEW: CPT | Mod: 26

## 2024-09-23 PROCEDURE — 99285 EMERGENCY DEPT VISIT HI MDM: CPT | Mod: FS

## 2024-09-23 RX ORDER — ENOXAPARIN SODIUM 150 MG/ML
40 INJECTION SUBCUTANEOUS EVERY 24 HOURS
Refills: 0 | Status: DISCONTINUED | OUTPATIENT
Start: 2024-09-23 | End: 2024-09-25

## 2024-09-23 RX ORDER — ONDANSETRON HCL/PF 4 MG/2 ML
4 VIAL (ML) INJECTION EVERY 6 HOURS
Refills: 0 | Status: DISCONTINUED | OUTPATIENT
Start: 2024-09-23 | End: 2024-09-25

## 2024-09-23 RX ORDER — FLUTICASONE PROPIONATE 50 UG/1
1 SPRAY, METERED NASAL EVERY 12 HOURS
Refills: 0 | Status: DISCONTINUED | OUTPATIENT
Start: 2024-09-23 | End: 2024-09-25

## 2024-09-23 RX ORDER — ONDANSETRON HCL/PF 4 MG/2 ML
4 VIAL (ML) INJECTION ONCE
Refills: 0 | Status: COMPLETED | OUTPATIENT
Start: 2024-09-23 | End: 2024-09-23

## 2024-09-23 RX ORDER — PANTOPRAZOLE SODIUM 40 MG/1
40 TABLET, DELAYED RELEASE ORAL ONCE
Refills: 0 | Status: COMPLETED | OUTPATIENT
Start: 2024-09-23 | End: 2024-09-23

## 2024-09-23 RX ORDER — ACETAMINOPHEN 325 MG
650 TABLET ORAL EVERY 6 HOURS
Refills: 0 | Status: DISCONTINUED | OUTPATIENT
Start: 2024-09-23 | End: 2024-09-25

## 2024-09-23 RX ORDER — SODIUM CHLORIDE 0.9 % (FLUSH) 0.9 %
1000 SYRINGE (ML) INJECTION
Refills: 0 | Status: DISCONTINUED | OUTPATIENT
Start: 2024-09-23 | End: 2024-09-25

## 2024-09-23 RX ORDER — SODIUM CHLORIDE 0.9 % (FLUSH) 0.9 %
1000 SYRINGE (ML) INJECTION ONCE
Refills: 0 | Status: COMPLETED | OUTPATIENT
Start: 2024-09-23 | End: 2024-09-23

## 2024-09-23 RX ORDER — SENNOSIDES 8.6 MG
2 TABLET ORAL AT BEDTIME
Refills: 0 | Status: DISCONTINUED | OUTPATIENT
Start: 2024-09-23 | End: 2024-09-25

## 2024-09-23 RX ORDER — PANTOPRAZOLE SODIUM 40 MG/1
40 TABLET, DELAYED RELEASE ORAL
Refills: 0 | Status: DISCONTINUED | OUTPATIENT
Start: 2024-09-23 | End: 2024-09-25

## 2024-09-23 RX ORDER — MONTELUKAST SODIUM 10 MG/1
10 TABLET, FILM COATED ORAL DAILY
Refills: 0 | Status: DISCONTINUED | OUTPATIENT
Start: 2024-09-23 | End: 2024-09-25

## 2024-09-23 RX ADMIN — PANTOPRAZOLE SODIUM 40 MILLIGRAM(S): 40 TABLET, DELAYED RELEASE ORAL at 15:01

## 2024-09-23 RX ADMIN — Medication 40 MILLIEQUIVALENT(S): at 17:31

## 2024-09-23 RX ADMIN — ENOXAPARIN SODIUM 40 MILLIGRAM(S): 150 INJECTION SUBCUTANEOUS at 21:13

## 2024-09-23 RX ADMIN — Medication 500 MILLILITER(S): at 15:01

## 2024-09-23 RX ADMIN — Medication 50 MILLILITER(S): at 21:13

## 2024-09-23 RX ADMIN — Medication 2 TABLET(S): at 21:13

## 2024-09-23 RX ADMIN — Medication 4 MILLIGRAM(S): at 15:01

## 2024-09-23 RX ADMIN — Medication 1000 MILLILITER(S): at 16:01

## 2024-09-23 NOTE — ED ADULT NURSE NOTE - OBJECTIVE STATEMENT
patient awake and alert as per daughter Wisar , patient c/o of abdominal pain with constipation and N/V for 3 weeks no chest pain , c/o of blood in stool , history of hemorrhoids

## 2024-09-23 NOTE — ED ADULT NURSE NOTE - NS ED NOTE  TALK SOMEONE YN
To Dr. Valdez.      Damian Larsen Abhilash, M.D. 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  I have had a PT scan and will get a PET scan on August 26th before visiting a surgeon regarding treatment.  In the meantime, my mouth is bleeding frequently and I would like to discontinue Xeralto until this is taken care of.  The doctor is Shayne Vieira [phone]        No

## 2024-09-23 NOTE — CONSULT NOTE ADULT - SUBJECTIVE AND OBJECTIVE BOX
[facility]    Ke Mary Cheney NP    [address] [place] [address] [phone]      Chief Complaint:  Patient is a 89y old  Female who presents with a chief complaint of     HPI:  86F (she is 86, not 89 per daughter at bedside) hx of  HTN, HLD, asthma, dementia who presented to our office today with complaints of decreased PO intake, nausea and intermittent vomiting for 2 weeks. Per daughter, pt unable to keep anything down and has no interest in eating food. She was previously well 2 weeks ago. She recently fell and has been unsteady on her feet. Intermittent fecal incontinence. Per daughter, pt more lethargic from baseline, falling asleep during encounter. She was previously in ED at [facility] and underwent work up including CTAP that was unremarkable.       Allergies:  codeine (Unknown)      Medications:      PMHX/PSHX:  Asthma    HTN (hypertension)    Dyslipidemia    S/P knee replacement        Family history:  No pertinent family history in first degree relatives        Social History:     ROS:     General:  No wt loss, fevers, chills, night sweats, fatigue,   Eyes:  Good vision, no reported pain  ENT:  No sore throat, pain, runny nose, dysphagia  CV:  No pain, palpitations, hypo/hypertension  Resp:  No dyspnea, cough, tachypnea, wheezing  GI:  No pain, No nausea, No vomiting, No diarrhea, No constipation, No weight loss, No fever, No pruritis, No rectal bleeding, No tarry stools, No dysphagia,  :  No pain, bleeding, incontinence, nocturia  Muscle:  No pain, weakness  Neuro:  No weakness, tingling, memory problems  Psych:  No fatigue, insomnia, mood problems, depression  Endocrine:  No polyuria, polydipsia, cold/heat intolerance  Heme:  No petechiae, ecchymosis, easy bruisability  Skin:  No rash, tattoos, scars, edema      PHYSICAL EXAM:   Vital Signs:  Vital Signs Last 24 Hrs  T(C): 36.4 (23 Sep 2024 13:36), Max: 36.4 (23 Sep 2024 13:36)  T(F): 97.5 (23 Sep 2024 13:36), Max: 97.5 (23 Sep 2024 13:36)  HR: 67 (23 Sep 2024 13:36) (67 - 67)  BP: 137/75 (23 Sep 2024 13:36) (137/75 - 137/75)  BP(mean): --  RR: 15 (23 Sep 2024 13:36) (15 - 15)  SpO2: 97% (23 Sep 2024 13:36) (97% - 97%)    Parameters below as of 23 Sep 2024 13:36  Patient On (Oxygen Delivery Method): room air      Daily Height in cm: 160.02 (23 Sep 2024 13:36)    Daily     GENERAL:  Weak and tired appearing   HEENT:  NC/AT,  conjunctivae clear and pink, no thyromegaly, nodules, adenopathy, no JVD, sclera -anicteric  CHEST:  Full & symmetric excursion, no increased effort, breath sounds clear  HEART:  Regular rhythm, S1, S2, no murmur/rub/S3/S4, no abdominal bruit, no edema  ABDOMEN:  Soft, non-tender, non-distended, normoactive bowel sounds,  no masses ,no hepato-splenomegaly, no signs of chronic liver disease  EXTEREMITIES:  no cyanosis,clubbing or edema  SKIN:  No rash/erythema/ecchymoses/petechiae/wounds/abscess/warm/dry  NEURO:  Alert, oriented, no asterixis, no tremor, no encephalopathy    LABS:                    Imaging:

## 2024-09-23 NOTE — ED PROVIDER NOTE - CLINICAL SUMMARY MEDICAL DECISION MAKING FREE TEXT BOX
89-year-old female with multiple medical issues has been having some chronic abdominal pain over the past several weeks.  Patient has had multiple CAT scans, without any acute findings.  Patient was seen by GI today, and was sent to the ED for admission.  The patient has been having some intermittent nausea and vomiting, which is happening almost daily.  The patient is having difficulty keeping food down as a result.  The patient is getting more more weak, losing some weight.  This is why GI sent her here for admission.  No other acute complaints at this time.  No known fever or chills.  No known trauma.  No acute headaches.  No other acute complaints.  Exam: Nontoxic, well-appearing.  Mucous membranes slight dry.  CTA BL, no W/R/R.  Normal respiratory effort.  Normal cardiac exam.  Abdomen soft, nontender, nondistended.  No HSM.  No CVAT.  Normal nonfocal neurologic exam.  No other acute findings on exam.  Acute dehydration, decreased p.o. intake with no acute findings on recent CTs.  Will check labs, IV fluids, admission

## 2024-09-23 NOTE — ED ADULT NURSE NOTE - INTERPRETATION SERVICES DECLINED
First attempt:      Called patient no answer, left voicemail for patient to return call to Ortiz Paredes MD office at [phone].    ECO Representative: Please reach out to [address] [facility] via Epic Secure Chat to see if a team member is available to take patient's call.    If team member is unavailable, please document in this encounter that patient returned call and route to: SONYA PAREDES NURSE MSG POOL [[phone]].    Patient/Caregiver requests family/friend to interpret.

## 2024-09-23 NOTE — ED PROVIDER NOTE - PROGRESS NOTE DETAILS
Spoke to GI, Dr. Martin who advised to admit pt to medicine for failure to thrive, workup and esophogram. Spoke to GI, Dr. Martin who advised to admit pt to medicine for failure to thrive, workup and esophogram. relates no repeat ct scan necessary at this time. Spoke to Dr. Baxter who accepted admission.

## 2024-09-23 NOTE — ED PROVIDER NOTE - OBJECTIVE STATEMENT
89-year-old female with history of dementia, asthma, hypertension, hyperlipidemia brought in by daughter with complaint of decreased p.o. intake, nausea and intermittent vomiting x 2 weeks.  As per daughter, patient is unable to keep anything down and does not want to eat any food.  States that patient is more lethargic from her baseline.  States that previously well 2 weeks ago.  States that patient was seen for same symptoms at [facility] on 9/14/2024 as she had labs including CBC/CMP/lipase/troponin/UA/CT head/CT abdomen and pelvis/chest x-ray without any acute findings and discharged home.  Daughter states that patient has not had no improvement of symptoms since.  States that patient was seen by GI, Dr. Martin today who sent patient to ED for admission and workup including possible esophagram.  Denies fever, diarrhea, difficulty breathing, hematemesis, recent travel or known sick contacts.

## 2024-09-23 NOTE — ED ADULT TRIAGE NOTE - CHIEF COMPLAINT QUOTE
My mother has been throwing up for 2 weeks. The gastro doctor said she need to go tot he ER for a blockage

## 2024-09-23 NOTE — ED PROVIDER NOTE - CARE PLAN
Principal Discharge DX:	Adult failure to thrive  Secondary Diagnosis:	Nausea and vomiting   1 Principal Discharge DX:	Adult failure to thrive  Secondary Diagnosis:	Nausea and vomiting  Secondary Diagnosis:	Dehydration

## 2024-09-23 NOTE — H&P ADULT - ASSESSMENT
89-year-old female with history of dementia, asthma, hypertension, hyperlipidemia brought in by daughter with complaint of decreased p.o. intake, nausea and intermittent vomiting x 2 weeks.  As per daughter, patient is unable to keep anything down and does not want to eat any food.  States that patient is more lethargic from her baseline.  States that previously well 2 weeks ago.  States that patient was seen for same symptoms at [facility] on 9/14/2024 as she had labs including CBC/CMP/lipase/troponin/UA/CT head/CT abdomen and pelvis/chest x-ray without any acute findings and discharged home.  Daughter states that patient has not had no improvement of symptoms since.  States that patient was seen by GI, Dr. Martin today who sent patient to ED for admission and workup including possible esophagram.  Denies fever, diarrhea, difficulty breathing, hematemesis, recent travel or known sick contacts.   IN ED afebrile, bp 135/75, HR 67, had hypokalemia and hyponatremia, received IV fluids and K repleted and admitted for further management   CT Abdomen and Pelvis w/ IV Cont (09.14.24 @ 14:49) >  No acute intra-abdominal pathology visualized.,Mild fullness the renal collecting systems may be related to the   distended bladder. No delayed nephrogram.  Degenerative changes of bone. Bones appear osteopenic although   limited in evaluation on CT scan. ORIF of left hip. Multilevel   compression fractures in the lower thoracic and upper lumbar spine are   grossly stable.    #Vomiting with dysphagia   gI consult noted  esophagram   soft diet   ppi   will observe, ESR, procalcitonin, no clinical evidence of infection  # Hyponatremia   iv fluids' # Hypokalemia   repleted  will monitor  #Dementia  supportive care   # Goals of care   full code as per daughter by bed side   # HLD   lipid panel  # DVT prophylaxis    89-year-old female with history of dementia, asthma, hypertension, hyperlipidemia brought in by daughter with complaint of decreased p.o. intake, nausea and intermittent vomiting x 2 weeks.  As per daughter, patient is unable to keep anything down and does not want to eat any food.  States that patient is more lethargic from her baseline.  States that previously well 2 weeks ago.  States that patient was seen for same symptoms at [facility] on 9/14/2024 as she had labs including CBC/CMP/lipase/troponin/UA/CT head/CT abdomen and pelvis/chest x-ray without any acute findings and discharged home.  Daughter states that patient has not had no improvement of symptoms since.  States that patient was seen by GI, Dr. Martin today who sent patient to ED for admission and workup including possible esophagram.  Denies fever, diarrhea, difficulty breathing, hematemesis, recent travel or known sick contacts.   IN ED afebrile, bp 135/75, HR 67, had hypokalemia and hyponatremia, received IV fluids and K repleted and admitted for further management   CT Abdomen and Pelvis w/ IV Cont (09.14.24 @ 14:49) >  No acute intra-abdominal pathology visualized.,Mild fullness the renal collecting systems may be related to the   distended bladder. No delayed nephrogram.  Degenerative changes of bone. Bones appear osteopenic although   limited in evaluation on CT scan. ORIF of left hip. Multilevel   compression fractures in the lower thoracic and upper lumbar spine are   grossly stable.    #Vomiting with dysphagia   gI consult noted  esophagram   soft diet  ppi   will observe, ESR, procalcitonin, no clinical evidence of infection    # HTN   cont home meds   # Hyponatremia   iv fluids' # Hypokalemia   repleted  will monitor  #Dementia  supportive care   # Goals of care   full code as per daughter by bed side   # HLD   lipid panel  # DVT prophylaxis

## 2024-09-23 NOTE — PATIENT PROFILE ADULT - FALL HARM RISK - HARM RISK INTERVENTIONS
Communicate Risk of Fall with Harm to all staff/Reinforce activity limits and safety measures with patient and family/Tailored Fall Risk Interventions/Visual Cue: Yellow wristband and red socks/Bed in lowest position, wheels locked, appropriate side rails in place/Call bell, personal items and telephone in reach/Instruct patient to call for assistance before getting out of bed or chair/Non-slip footwear when patient is out of bed/[place] to call system/Physically safe environment - no spills, clutter or unnecessary equipment/Purposeful Proactive Rounding/Room/bathroom lighting operational, light cord in reach Assistance with ambulation/Assistance OOB with selected safe patient handling equipment/Communicate Risk of Fall with Harm to all staff/Discuss with provider need for PT consult/Monitor gait and stability/Provide patient with walking aids - walker, cane, crutches/Reinforce activity limits and safety measures with patient and family/Tailored Fall Risk Interventions/Visual Cue: Yellow wristband and red socks/Bed in lowest position, wheels locked, appropriate side rails in place/Call bell, personal items and telephone in reach/Instruct patient to call for assistance before getting out of bed or chair/Non-slip footwear when patient is out of bed/[place] to call system/Physically safe environment - no spills, clutter or unnecessary equipment/Purposeful Proactive Rounding/Room/bathroom lighting operational, light cord in reach

## 2024-09-23 NOTE — CONSULT NOTE ADULT - ASSESSMENT
86F hx of  HTN, HLD, asthma, dementia who presented to our office today with complaints of decreased PO intake, nausea and intermittent vomiting for 2 weeks.     Recommendations:  - Can consider esophagram to rule out any intraluminal pathology  - ID work up  - Zofran prn nausea   - PPI 40mg PO QD  - May need palliative/PT involvement given overall decline in clinical status and now unsteady on feet  - Miralax 17g QD  - IVF  - Will follow with you    Mookie Martin M.D.  [facility]  [phone]

## 2024-09-23 NOTE — ED ADULT NURSE NOTE - NSFALLHARMRISKINTERV_ED_ALL_ED
Assistance OOB with selected safe patient handling equipment if applicable/Communicate risk of Fall with Harm to all staff, patient, and family/Provide visual cue: red socks, yellow wristband, yellow gown, etc/Reinforce activity limits and safety measures with patient and family/Bed in lowest position, wheels locked, appropriate side rails in place/Call bell, personal items and telephone in reach/Instruct patient to call for assistance before getting out of bed/chair/stretcher/Non-slip footwear applied when patient is off stretcher/[place] to call system/Physically safe environment - no spills, clutter or unnecessary equipment/Purposeful Proactive Rounding/Room/bathroom lighting operational, light cord in reach

## 2024-09-23 NOTE — H&P ADULT - HISTORY OF PRESENT ILLNESS
89-year-old female with history of dementia, asthma, hypertension, hyperlipidemia brought in by daughter with complaint of decreased p.o. intake, nausea and intermittent vomiting x 2 weeks.  As per daughter, patient is unable to keep anything down and does not want to eat any food.  States that patient is more lethargic from her baseline.  States that previously well 2 weeks ago.  States that patient was seen for same symptoms at [facility] on 9/14/2024 as she had labs including CBC/CMP/lipase/troponin/UA/CT head/CT abdomen and pelvis/chest x-ray without any acute findings and discharged home.  Daughter states that patient has not had no improvement of symptoms since.  States that patient was seen by GI, Dr. Martin today who sent patient to ED for admission and workup including possible esophagram.  Denies fever, diarrhea, difficulty breathing, hematemesis, recent travel or known sick contacts.   IN ED afebrile, bp 135/75, HR 67, had hypokalemia and hyponatremia, received IV fluids and K repleted and admitted for further management   CT Abdomen and Pelvis w/ IV Cont (09.14.24 @ 14:49) >  No acute intra-abdominal pathology visualized.,Mild fullness the renal collecting systems may be related to the   distended bladder. No delayed nephrogram.  Degenerative changes of bone. Bones appear osteopenic although   limited in evaluation on CT scan. ORIF of left hip. Multilevel   compression fractures in the lower thoracic and upper lumbar spine are   grossly stable.    < end of copied text >  < from: CT Abdomen and Pelvis w/ IV Cont (09.14.24 @ 14:49) >  Umbilical hernia containing fat.    < end of copied text >

## 2024-09-24 LAB
ALBUMIN SERPL ELPH-MCNC: 3.5 G/DL — SIGNIFICANT CHANGE UP (ref 3.3–5)
ALP SERPL-CCNC: 107 U/L — SIGNIFICANT CHANGE UP (ref 30–120)
ALT FLD-CCNC: 18 U/L — SIGNIFICANT CHANGE UP (ref 10–60)
ANION GAP SERPL CALC-SCNC: 7 MMOL/L — SIGNIFICANT CHANGE UP (ref 5–17)
AST SERPL-CCNC: 20 U/L — SIGNIFICANT CHANGE UP (ref 10–40)
BASOPHILS # BLD AUTO: 0.06 K/UL — SIGNIFICANT CHANGE UP (ref 0–0.2)
BASOPHILS NFR BLD AUTO: 1.3 % — SIGNIFICANT CHANGE UP (ref 0–2)
BILIRUB SERPL-MCNC: 1 MG/DL — SIGNIFICANT CHANGE UP (ref 0.2–1.2)
BUN SERPL-MCNC: 7 MG/DL — SIGNIFICANT CHANGE UP (ref 7–23)
CALCIUM SERPL-MCNC: 9.4 MG/DL — SIGNIFICANT CHANGE UP (ref 8.4–10.5)
CHLORIDE SERPL-SCNC: 98 MMOL/L — SIGNIFICANT CHANGE UP (ref 96–108)
CHOLEST SERPL-MCNC: 136 MG/DL — SIGNIFICANT CHANGE UP
CO2 SERPL-SCNC: 31 MMOL/L — SIGNIFICANT CHANGE UP (ref 22–31)
CREAT SERPL-MCNC: 0.62 MG/DL — SIGNIFICANT CHANGE UP (ref 0.5–1.3)
EGFR: 81 ML/MIN/1.73M2 — SIGNIFICANT CHANGE UP
EOSINOPHIL # BLD AUTO: 0.27 K/UL — SIGNIFICANT CHANGE UP (ref 0–0.5)
EOSINOPHIL NFR BLD AUTO: 5.7 % — SIGNIFICANT CHANGE UP (ref 0–6)
GLUCOSE SERPL-MCNC: 85 MG/DL — SIGNIFICANT CHANGE UP (ref 70–99)
HCT VFR BLD CALC: 40.1 % — SIGNIFICANT CHANGE UP (ref 34.5–45)
HDLC SERPL-MCNC: 68 MG/DL — SIGNIFICANT CHANGE UP
HGB BLD-MCNC: 13.8 G/DL — SIGNIFICANT CHANGE UP (ref 11.5–15.5)
IMM GRANULOCYTES NFR BLD AUTO: 0.2 % — SIGNIFICANT CHANGE UP (ref 0–0.9)
LIPID PNL WITH DIRECT LDL SERPL: 54 MG/DL — SIGNIFICANT CHANGE UP
LYMPHOCYTES # BLD AUTO: 1.86 K/UL — SIGNIFICANT CHANGE UP (ref 1–3.3)
LYMPHOCYTES # BLD AUTO: 39.1 % — SIGNIFICANT CHANGE UP (ref 13–44)
MCHC RBC-ENTMCNC: 28.5 PG — SIGNIFICANT CHANGE UP (ref 27–34)
MCHC RBC-ENTMCNC: 34.4 G/DL — SIGNIFICANT CHANGE UP (ref 32–36)
MCV RBC AUTO: 82.7 FL — SIGNIFICANT CHANGE UP (ref 80–100)
MONOCYTES # BLD AUTO: 0.57 K/UL — SIGNIFICANT CHANGE UP (ref 0–0.9)
MONOCYTES NFR BLD AUTO: 12 % — SIGNIFICANT CHANGE UP (ref 2–14)
NEUTROPHILS # BLD AUTO: 1.99 K/UL — SIGNIFICANT CHANGE UP (ref 1.8–7.4)
NEUTROPHILS NFR BLD AUTO: 41.7 % — LOW (ref 43–77)
NON HDL CHOLESTEROL: 67 MG/DL — SIGNIFICANT CHANGE UP
NRBC # BLD: 0 /100 WBCS — SIGNIFICANT CHANGE UP (ref 0–0)
PLATELET # BLD AUTO: 258 K/UL — SIGNIFICANT CHANGE UP (ref 150–400)
POTASSIUM SERPL-MCNC: 3.4 MMOL/L — LOW (ref 3.5–5.3)
POTASSIUM SERPL-SCNC: 3.4 MMOL/L — LOW (ref 3.5–5.3)
PROCALCITONIN SERPL-MCNC: 0.02 NG/ML — SIGNIFICANT CHANGE UP (ref 0.02–0.1)
PROT SERPL-MCNC: 6.9 G/DL — SIGNIFICANT CHANGE UP (ref 6–8.3)
RBC # BLD: 4.85 M/UL — SIGNIFICANT CHANGE UP (ref 3.8–5.2)
RBC # FLD: 13 % — SIGNIFICANT CHANGE UP (ref 10.3–14.5)
SODIUM SERPL-SCNC: 136 MMOL/L — SIGNIFICANT CHANGE UP (ref 135–145)
T3 SERPL-MCNC: 63 NG/DL — LOW (ref 80–200)
T4 AB SER-ACNC: 9 UG/DL — SIGNIFICANT CHANGE UP (ref 4.6–12)
TRIGL SERPL-MCNC: 62 MG/DL — SIGNIFICANT CHANGE UP
TSH SERPL-MCNC: 2.5 UIU/ML — SIGNIFICANT CHANGE UP (ref 0.27–4.2)
WBC # BLD: 4.76 K/UL — SIGNIFICANT CHANGE UP (ref 3.8–10.5)
WBC # FLD AUTO: 4.76 K/UL — SIGNIFICANT CHANGE UP (ref 3.8–10.5)

## 2024-09-24 PROCEDURE — 74220 X-RAY XM ESOPHAGUS 1CNTRST: CPT | Mod: 26

## 2024-09-24 RX ADMIN — FLUTICASONE PROPIONATE 1 SPRAY(S): 50 SPRAY, METERED NASAL at 06:32

## 2024-09-24 RX ADMIN — Medication 2 TABLET(S): at 21:41

## 2024-09-24 RX ADMIN — Medication 50 MILLILITER(S): at 12:35

## 2024-09-24 RX ADMIN — PANTOPRAZOLE SODIUM 40 MILLIGRAM(S): 40 TABLET, DELAYED RELEASE ORAL at 06:32

## 2024-09-24 RX ADMIN — ENOXAPARIN SODIUM 40 MILLIGRAM(S): 150 INJECTION SUBCUTANEOUS at 21:41

## 2024-09-24 RX ADMIN — MONTELUKAST SODIUM 10 MILLIGRAM(S): 10 TABLET, FILM COATED ORAL at 11:22

## 2024-09-24 RX ADMIN — Medication 40 MILLIEQUIVALENT(S): at 11:22

## 2024-09-24 RX ADMIN — FLUTICASONE PROPIONATE 1 SPRAY(S): 50 SPRAY, METERED NASAL at 17:07

## 2024-09-24 NOTE — PROGRESS NOTE ADULT - ASSESSMENT
86F hx of  HTN, HLD, asthma, dementia who presented to our office today with complaints of decreased PO intake, nausea and intermittent vomiting for 2 weeks.     Recommendations:  - Esophagram  - Zofran prn nausea   - PPI 40mg PO QD  - May need palliative/PT involvement given overall decline in clinical status and now unsteady on feet  - Miralax 17g QD  - IVF  - Will follow with you    Mookie Martin M.D.  [facility]  [phone]

## 2024-09-24 NOTE — CARE COORDINATION ASSESSMENT. - OTHER PERTINENT DISCHARGE PLANNING INFORMATION:
AIDEN attempted to meet with patient who is dementia, sW spoke with daughter Siri [phone] who was considering placement. SW explained process and choices. She asked that I email her list of SNF for [facility] but at this time she prefers to take her home with home care. I updated  and spoke with MD. Daughter provided collateral she has 40 hours of MLTC and daughter cares for her during the other hours. [facility] [phone] ext: [phone] her fax [phone]. SW left message that plan will for home with resumption of aid. SW provided support and will follow if needed. I updated . No steps to enter home. She ambulates with walker showers with assistance. Commode, shower chair at home

## 2024-09-24 NOTE — PHYSICAL THERAPY INITIAL EVALUATION ADULT - ADDITIONAL COMMENTS
Pt lives with family in a private house, there are no stairs to enter and no stairs to negotiate within. PTA pt was ambulating independently with RW and required assistance with ADLs. Pt owns a RW, commode and shower chair. Pt is Nepali speaking, daughter at bedside to translate.

## 2024-09-24 NOTE — CARE COORDINATION ASSESSMENT. - NSDCPLANSERVICES_GEN_ALL_CORE
daughter has initially indicated possible rehab and then possible rollover to long [facility]. Now she prefers home with home care and will consider private hiring nurse to assess at home when she feels she wants  placement for AHMET/Anticipated Needs Unclear at Present

## 2024-09-24 NOTE — PHYSICAL THERAPY INITIAL EVALUATION ADULT - GAIT DEVIATIONS NOTED, PT EVAL
decreased dario/increased time in double stance/decreased step length/decreased weight-shifting ability

## 2024-09-24 NOTE — CARE COORDINATION ASSESSMENT. - NSPASTMEDSURGHISTORY_GEN_ALL_CORE_FT
PAST MEDICAL & SURGICAL HISTORY:  Dyslipidemia      HTN (hypertension)      Asthma      S/P knee replacement  left

## 2024-09-24 NOTE — PROGRESS NOTE ADULT - ASSESSMENT
89-year-old female with history of dementia, asthma, hypertension, hyperlipidemia brought in by daughter with complaint of decreased p.o. intake, nausea and intermittent vomiting x 2 weeks.  As per daughter, patient is unable to keep anything down and does not want to eat any food.  States that patient is more lethargic from her baseline.  States that previously well 2 weeks ago.  States that patient was seen for same symptoms at [facility] on 9/14/2024 as she had labs including CBC/CMP/lipase/troponin/UA/CT head/CT abdomen and pelvis/chest x-ray without any acute findings and discharged home.  Daughter states that patient has not had no improvement of symptoms since.  States that patient was seen by GI, Dr. Martin today who sent patient to ED for admission and workup including possible esophagram.  Denies fever, diarrhea, difficulty breathing, hematemesis, recent travel or known sick contacts.   IN ED afebrile, bp 135/75, HR 67, had hypokalemia and hyponatremia, received IV fluids and K repleted and admitted for further management   CT Abdomen and Pelvis w/ IV Cont (09.14.24 @ 14:49) >  No acute intra-abdominal pathology visualized.,Mild fullness the renal collecting systems may be related to the   distended bladder. No delayed nephrogram.  Degenerative changes of bone. Bones appear osteopenic although   limited in evaluation on CT scan. ORIF of left hip. Multilevel   compression fractures in the lower thoracic and upper lumbar spine are   grossly stable.    #Vomiting with dysphagia   gI consult noted  esophagram   soft diet   GI consult and F up noted   palliative care consult after esophagram   ppi   will observe, ESR, procalcitonin, no clinical evidence of infection  # Hyponatremia   iv fluids' # Hypokalemia   repleted  will monitor  #Dementia  supportive care   # Goals of care   full code as per daughter by bed side   # HLD   lipid panel  # DVT prophylaxis

## 2024-09-24 NOTE — PHYSICAL THERAPY INITIAL EVALUATION ADULT - GAIT TRAINING, PT EVAL
Patient will ambulate 100 feet independently with a rolling walker within 1-2 weeks for safe household ambulation.

## 2024-09-24 NOTE — CARE COORDINATION ASSESSMENT. - NSCAREPROVIDERS_GEN_ALL_CORE_FT
CARE PROVIDERS:  Accepting Physician: Najma Baxter  Administration: Chelita Patrick  Admitting: Najma Baxter  Attending: Najma Baxter  Case Management: Santaromana, Anna  Consultant: Mookie Martin  Consultant: Eliana Fountain ED ACP: Sharonda Romo ED Attending: Derrick Kwong ED Nurse: Cyndi Sandhu  Infection Control: Briseyda Brown  Nurse: Simona Toney  Nurse: Chantell Delong  Nurse: Aleyda Zamudio  Ordered: ServiceAccount, SCMMLM  Ordered: Physician, Ordering  Outpatient Provider: Lars Aguilar  Outpatient Provider: Seth Aponte  Override: Cyndi Sandhu  Override: Darcie Montes  PCA/Nursing Assistant: Pilar Flowers  PCA/Nursing Assistant: Carol Rivers  Physical Therapy: Gris Ann  Physical Therapy: Rosalino Marrufo  Primary Team: Najma Baxter  Registered Dietitian: Cortney Longoria  : Kati Castellanos// Supp. Assoc.: Kellie Dockery

## 2024-09-24 NOTE — PHYSICAL THERAPY INITIAL EVALUATION ADULT - PERTINENT HX OF CURRENT PROBLEM, REHAB EVAL
Pt is a 89 y/o female with history of dementia, asthma, hypertension, hyperlipidemia brought in by daughter with complaint of decreased p.o. intake, nausea and intermittent vomiting x 2 weeks.  As per daughter, patient is unable to keep anything down and does not want to eat any food.  States that patient is more lethargic from her baseline.  States that previously well 2 weeks ago.  States that patient was seen for same symptoms at [facility] on 9/14/2024 as she had labs including CBC/CMP/lipase/troponin/UA/CT head/CT abdomen and pelvis/chest x-ray without any acute findings and discharged home.  Daughter states that patient has not had no improvement of symptoms since.  States that patient was seen by GI, Dr. Martin today who sent patient to ED for admission and workup including possible esophagram.  Denies fever, diarrhea, difficulty breathing, hematemesis, recent travel or known sick contacts. IN ED afebrile, bp 135/75, HR 67, had hypokalemia and hyponatremia, received IV fluids and K repleted and admitted for further management

## 2024-09-25 ENCOUNTER — TRANSCRIPTION ENCOUNTER (OUTPATIENT)
Age: 89
End: 2024-09-25

## 2024-09-25 VITALS
DIASTOLIC BLOOD PRESSURE: 76 MMHG | HEART RATE: 62 BPM | OXYGEN SATURATION: 98 % | TEMPERATURE: 98 F | SYSTOLIC BLOOD PRESSURE: 146 MMHG | RESPIRATION RATE: 18 BRPM

## 2024-09-25 LAB
ANION GAP SERPL CALC-SCNC: 10 MMOL/L — SIGNIFICANT CHANGE UP (ref 5–17)
BUN SERPL-MCNC: 8 MG/DL — SIGNIFICANT CHANGE UP (ref 7–23)
CALCIUM SERPL-MCNC: 9.4 MG/DL — SIGNIFICANT CHANGE UP (ref 8.4–10.5)
CHLORIDE SERPL-SCNC: 97 MMOL/L — SIGNIFICANT CHANGE UP (ref 96–108)
CO2 SERPL-SCNC: 27 MMOL/L — SIGNIFICANT CHANGE UP (ref 22–31)
CREAT SERPL-MCNC: 0.61 MG/DL — SIGNIFICANT CHANGE UP (ref 0.5–1.3)
EGFR: 82 ML/MIN/1.73M2 — SIGNIFICANT CHANGE UP
GLUCOSE SERPL-MCNC: 96 MG/DL — SIGNIFICANT CHANGE UP (ref 70–99)
HCT VFR BLD CALC: 40.3 % — SIGNIFICANT CHANGE UP (ref 34.5–45)
HGB BLD-MCNC: 13.8 G/DL — SIGNIFICANT CHANGE UP (ref 11.5–15.5)
MCHC RBC-ENTMCNC: 28.4 PG — SIGNIFICANT CHANGE UP (ref 27–34)
MCHC RBC-ENTMCNC: 34.2 G/DL — SIGNIFICANT CHANGE UP (ref 32–36)
MCV RBC AUTO: 82.9 FL — SIGNIFICANT CHANGE UP (ref 80–100)
NRBC # BLD: 0 /100 WBCS — SIGNIFICANT CHANGE UP (ref 0–0)
PLATELET # BLD AUTO: 258 K/UL — SIGNIFICANT CHANGE UP (ref 150–400)
POTASSIUM SERPL-MCNC: 3.2 MMOL/L — LOW (ref 3.5–5.3)
POTASSIUM SERPL-MCNC: 4.5 MMOL/L — SIGNIFICANT CHANGE UP (ref 3.5–5.3)
POTASSIUM SERPL-SCNC: 3.2 MMOL/L — LOW (ref 3.5–5.3)
POTASSIUM SERPL-SCNC: 4.5 MMOL/L — SIGNIFICANT CHANGE UP (ref 3.5–5.3)
RBC # BLD: 4.86 M/UL — SIGNIFICANT CHANGE UP (ref 3.8–5.2)
RBC # FLD: 13.1 % — SIGNIFICANT CHANGE UP (ref 10.3–14.5)
SODIUM SERPL-SCNC: 134 MMOL/L — LOW (ref 135–145)
WBC # BLD: 6.47 K/UL — SIGNIFICANT CHANGE UP (ref 3.8–10.5)
WBC # FLD AUTO: 6.47 K/UL — SIGNIFICANT CHANGE UP (ref 3.8–10.5)

## 2024-09-25 PROCEDURE — 85652 RBC SED RATE AUTOMATED: CPT

## 2024-09-25 PROCEDURE — 80061 LIPID PANEL: CPT

## 2024-09-25 PROCEDURE — 84132 ASSAY OF SERUM POTASSIUM: CPT

## 2024-09-25 PROCEDURE — 84436 ASSAY OF TOTAL THYROXINE: CPT

## 2024-09-25 PROCEDURE — 85610 PROTHROMBIN TIME: CPT

## 2024-09-25 PROCEDURE — 80048 BASIC METABOLIC PNL TOTAL CA: CPT

## 2024-09-25 PROCEDURE — 96375 TX/PRO/DX INJ NEW DRUG ADDON: CPT

## 2024-09-25 PROCEDURE — 81001 URINALYSIS AUTO W/SCOPE: CPT

## 2024-09-25 PROCEDURE — 97161 PT EVAL LOW COMPLEX 20 MIN: CPT

## 2024-09-25 PROCEDURE — 36415 COLL VENOUS BLD VENIPUNCTURE: CPT

## 2024-09-25 PROCEDURE — 80053 COMPREHEN METABOLIC PANEL: CPT

## 2024-09-25 PROCEDURE — 85027 COMPLETE CBC AUTOMATED: CPT

## 2024-09-25 PROCEDURE — 99285 EMERGENCY DEPT VISIT HI MDM: CPT | Mod: 25

## 2024-09-25 PROCEDURE — 94640 AIRWAY INHALATION TREATMENT: CPT

## 2024-09-25 PROCEDURE — 74220 X-RAY XM ESOPHAGUS 1CNTRST: CPT

## 2024-09-25 PROCEDURE — 93005 ELECTROCARDIOGRAM TRACING: CPT

## 2024-09-25 PROCEDURE — 83690 ASSAY OF LIPASE: CPT

## 2024-09-25 PROCEDURE — 83735 ASSAY OF MAGNESIUM: CPT

## 2024-09-25 PROCEDURE — 85730 THROMBOPLASTIN TIME PARTIAL: CPT

## 2024-09-25 PROCEDURE — 84480 ASSAY TRIIODOTHYRONINE (T3): CPT

## 2024-09-25 PROCEDURE — 71045 X-RAY EXAM CHEST 1 VIEW: CPT

## 2024-09-25 PROCEDURE — 96374 THER/PROPH/DIAG INJ IV PUSH: CPT

## 2024-09-25 PROCEDURE — 84145 PROCALCITONIN (PCT): CPT

## 2024-09-25 PROCEDURE — 85025 COMPLETE CBC W/AUTO DIFF WBC: CPT

## 2024-09-25 PROCEDURE — 96361 HYDRATE IV INFUSION ADD-ON: CPT

## 2024-09-25 PROCEDURE — 84443 ASSAY THYROID STIM HORMONE: CPT

## 2024-09-25 RX ORDER — PANTOPRAZOLE SODIUM 40 MG/1
1 TABLET, DELAYED RELEASE ORAL
Qty: 30 | Refills: 0
Start: 2024-09-25 | End: 2024-10-24

## 2024-09-25 RX ORDER — BISACODYL 5 MG/1
10 TABLET, COATED ORAL DAILY
Refills: 0 | Status: DISCONTINUED | OUTPATIENT
Start: 2024-09-25 | End: 2024-09-25

## 2024-09-25 RX ORDER — BISACODYL 5 MG/1
10 TABLET, COATED ORAL ONCE
Refills: 0 | Status: COMPLETED | OUTPATIENT
Start: 2024-09-25 | End: 2024-09-25

## 2024-09-25 RX ADMIN — Medication 40 MILLIEQUIVALENT(S): at 15:18

## 2024-09-25 RX ADMIN — Medication 40 MILLIEQUIVALENT(S): at 11:33

## 2024-09-25 RX ADMIN — FLUTICASONE PROPIONATE 1 SPRAY(S): 50 SPRAY, METERED NASAL at 06:02

## 2024-09-25 RX ADMIN — MONTELUKAST SODIUM 10 MILLIGRAM(S): 10 TABLET, FILM COATED ORAL at 10:56

## 2024-09-25 RX ADMIN — PANTOPRAZOLE SODIUM 40 MILLIGRAM(S): 40 TABLET, DELAYED RELEASE ORAL at 06:02

## 2024-09-25 RX ADMIN — BISACODYL 10 MILLIGRAM(S): 5 TABLET, COATED ORAL at 10:56

## 2024-09-25 NOTE — DISCHARGE NOTE PROVIDER - NSDCMRMEDTOKEN_GEN_ALL_CORE_FT
Advair  mcg-21 mcg/inh inhalation aerosol: 2 puff(s) inhaled 2 times a day  famotidine 20 mg oral tablet: 1 tab(s) orally once a day (at bedtime)  Flonase 50 mcg/inh nasal spray: 1 spray(s) nasal once a day  Linzess 290 mcg oral capsule: 1 cap(s) orally once a day  Lipitor 10 mg oral tablet: 1 tab(s) orally once a day (at bedtime)  magnesium hydroxide/aluminum hydroxide/simethicone 400 mg-400 mg-40 mg/5 mL oral suspension: 10 milliliter(s) orally every 8 hours as needed for  indigestion  Multiple Vitamins oral tablet: 1 tab(s) orally once a day  Norvasc 5 mg oral tablet: 1 tab(s) orally once a day  ondansetron 4 mg oral tablet, disintegratin tab(s) orally 3 times a day  pantoprazole 40 mg oral delayed release tablet: 1 tab(s) orally once a day (before a meal)  polyethylene glycol 3350 oral powder for reconstitution: 17 gram(s) orally 2 times a day  senna oral tablet: 2 tab(s) orally once a day (at bedtime)  Singulair 10 mg oral tablet: 1 tab(s) orally once a day  traMADol 50 mg oral tablet: 1 tab(s) orally every 6 hours, As needed, Moderate Pain (4 - 6) MDD:4 tabs  Tylenol 325 mg oral tablet: 2 tab(s) orally every 6 hours, As Needed for mild pain

## 2024-09-25 NOTE — PROGRESS NOTE ADULT - ASSESSMENT
89-year-old female with history of dementia, asthma, hypertension, hyperlipidemia brought in by daughter with complaint of decreased p.o. intake, nausea and intermittent vomiting x 2 weeks.  As per daughter, patient is unable to keep anything down and does not want to eat any food.  States that patient is more lethargic from her baseline.  States that previously well 2 weeks ago.  States that patient was seen for same symptoms at [facility] on 9/14/2024 as she had labs including CBC/CMP/lipase/troponin/UA/CT head/CT abdomen and pelvis/chest x-ray without any acute findings and discharged home.  Daughter states that patient has not had no improvement of symptoms since.  States that patient was seen by GI, Dr. Martin today who sent patient to ED for admission and workup including possible esophagram.  Denies fever, diarrhea, difficulty breathing, hematemesis, recent travel or known sick contacts.   IN ED afebrile, bp 135/75, HR 67, had hypokalemia and hyponatremia, received IV fluids and K repleted and admitted for further management   CT Abdomen and Pelvis w/ IV Cont (09.14.24 @ 14:49) >  No acute intra-abdominal pathology visualized.,Mild fullness the renal collecting systems may be related to the   distended bladder. No delayed nephrogram.  Degenerative changes of bone. Bones appear osteopenic although   limited in evaluation on CT scan. ORIF of left hip. Multilevel   compression fractures in the lower thoracic and upper lumbar spine are   grossly stable.    #Vomiting with dysphagia   gI consult noted  esophagram ,- no dysphagia, no obstruction , has dysmotility and small hiatal hernia   soft diet   GI F up noted noted  #Constipation  miralax bid   dulcolax suppossitory stat  and prn   ppi  Infection ruled out  # Hyponatremia- resolved   # Hypokalemia   repleted    #Dementia- pt has appointment with neurology out patient on Oct 7, family do not want any consult here  supportive care   # Goals of care   full code as per daughter by bed side   # HLD   lipid panel normal  # DVT prophylaxis

## 2024-09-25 NOTE — DISCHARGE NOTE PROVIDER - HOSPITAL COURSE
89-year-old female with history of dementia, asthma, hypertension, hyperlipidemia brought in by daughter with complaint of decreased p.o. intake, nausea and intermittent vomiting x 2 weeks.  As per daughter, patient is unable to keep anything down and does not want to eat any food.  States that patient is more lethargic from her baseline.  States that previously well 2 weeks ago.  States that patient was seen for same symptoms at [facility] on 9/14/2024 as she had labs including CBC/CMP/lipase/troponin/UA/CT head/CT abdomen and pelvis/chest x-ray without any acute findings and discharged home.  Daughter states that patient has not had no improvement of symptoms since.  States that patient was seen by GI, Dr. Martin today who sent patient to ED for admission and workup including possible esophagram.  Denies fever, diarrhea, difficulty breathing, hematemesis, recent travel or known sick contacts.   IN ED afebrile, bp 135/75, HR 67, had hypokalemia and hyponatremia, received IV fluids and K repleted and admitted for further management   CT Abdomen and Pelvis w/ IV Cont (09.14.24 @ 14:49) >  No acute intra-abdominal pathology visualized.,Mild fullness the renal collecting systems may be related to the   distended bladder. No delayed nephrogram.  Degenerative changes of bone. Bones appear osteopenic although   limited in evaluation on CT scan. ORIF of left hip. Multilevel   compression fractures in the lower thoracic and upper lumbar spine are   grossly stable.    #Vomiting with suspected dysphagia   gI consult noted  esophagram ,- no dysphagia, no obstruction , has dysmotility and small hiatal hernia   soft diet   GI F up noted noted  #Constipation  miralax bid   dulcolax suppossitory stat  and prn   ppi  Infection ruled out  # Dehydration    hydrated  # Hyponatremia- resolved   # Hypokalemia   repleted    #Dementia- pt has appointment with neurology out patient on Oct 7, family do not want any consult here  supportive care   # Goals of care   full code as per daughter by bed side   # HLD   lipid panel normal  # DVT prophylaxis

## 2024-09-25 NOTE — PROGRESS NOTE ADULT - ASSESSMENT
86F hx of  HTN, HLD, asthma, dementia who presented to our office today with complaints of decreased PO intake, nausea and intermittent vomiting for 2 weeks.     Recommendations:  - Esophagram noted; no evidence of obstruction possible dysmotility   - Zofran prn nausea   - PPI 40mg PO QD  - f/u PT recs   - Miralax 17g QD  - IVF  - Will follow with you    Mookie Martin M.D.  [facility]  [phone]   86F hx of  HTN, HLD, asthma, dementia who presented to our office today with complaints of decreased PO intake, nausea and intermittent vomiting for 2 weeks.     Recommendations:  - Esophagram noted; no evidence of obstruction possible dysmotility   - Zofran prn nausea   - PPI 40mg PO QD  - f/u PT recs   - Please increase Miralax to BID, may be discharged on this regimen   - IVF  - Will follow with you    Mookie Martin M.D.  [facility]  [phone]

## 2024-09-25 NOTE — PROGRESS NOTE ADULT - REASON FOR ADMISSION
vomiting, dysphagia
vomiting
Melinda Mariano MD - Attending Physician: Pt here with vomiting and diarrhea. Well appearing, coloring in the room. Nontender abd. Zofran and po chall

## 2024-09-25 NOTE — DISCHARGE NOTE NURSING/CASE MANAGEMENT/SOCIAL WORK - PATIENT PORTAL LINK FT
You can access the FollowMyHealth Patient Portal offered by [facility] by registering at the following website: [email or web address]. By joining [employer]’s FollowMyHealth portal, you will also be able to view your health information using other applications (apps) compatible with our system.

## 2024-09-25 NOTE — DISCHARGE NOTE PROVIDER - PREFACE STATEMENT FOR MINUTES SPENT
Spoke with pts  about normal T4 and TSH levels. No further questions at this time.    I personally spent

## 2024-09-25 NOTE — DISCHARGE NOTE PROVIDER - NSDCCPCAREPLAN_GEN_ALL_CORE_FT
PRINCIPAL DISCHARGE DIAGNOSIS  Diagnosis: Nausea and vomiting  Assessment and Plan of Treatment: esophageal dysmotility ,and small hiatal hernia, continue pantoprazole, and f up with GI      SECONDARY DISCHARGE DIAGNOSES  Diagnosis: Dehydration  Assessment and Plan of Treatment: resolved    Diagnosis: Dementia  Assessment and Plan of Treatment: f up with neuro

## 2024-09-25 NOTE — PROGRESS NOTE ADULT - SUBJECTIVE AND OBJECTIVE BOX
Patient is a 89y old  Female who presents with a chief complaint of difficulty swallowing    INTERVAL HPI/OVERNIGHT EVENTS:overnight events noted    Home Medications:  Advair  mcg-21 mcg/inh inhalation aerosol: 2 puff(s) inhaled 2 times a day (19 Jun 2021 15:30)  Flonase 50 mcg/inh nasal spray: 1 spray(s) nasal once a day (19 Jun 2021 15:30)  Linzess 290 mcg oral capsule: 1 cap(s) orally once a day (19 Jun 2021 15:30)  Lipitor 10 mg oral tablet: 1 tab(s) orally once a day (at bedtime) (19 Jun 2021 15:30)  Multiple Vitamins oral tablet: 1 tab(s) orally once a day (21 Jun 2021 11:14)  Norvasc 5 mg oral tablet: 1 tab(s) orally once a day (19 Jun 2021 15:30)  omeprazole 20 mg oral delayed release capsule: 1 cap(s) orally once a day (19 Jun 2021 15:30)  polyethylene glycol 3350 oral powder for reconstitution: 17 gram(s) orally once a day (21 Jun 2021 11:14)  Singulair 10 mg oral tablet: 1 tab(s) orally once a day (19 Jun 2021 15:30)  Tylenol 325 mg oral tablet: 2 tab(s) orally every 6 hours, As Needed for mild pain (21 Jun 2021 11:14)      MEDICATIONS  (STANDING):  enoxaparin Injectable 40 milliGRAM(s) SubCutaneous every 24 hours  fluticasone propionate 50 MICROgram(s)/spray Nasal Spray 1 Spray(s) Both Nostrils every 12 hours  montelukast 10 milliGRAM(s) Oral daily  pantoprazole    Tablet 40 milliGRAM(s) Oral before breakfast  potassium chloride    Tablet ER 40 milliEquivalent(s) Oral once  senna 2 Tablet(s) Oral at bedtime  sodium chloride 0.9%. 1000 milliLiter(s) (50 mL/Hr) IV Continuous <Continuous>    MEDICATIONS  (PRN):  acetaminophen     Tablet .. 650 milliGRAM(s) Oral every 6 hours PRN Mild Pain (1 - 3)  ondansetron Injectable 4 milliGRAM(s) IV Push every 6 hours PRN Nausea and/or Vomiting      Allergies    codeine (Unknown)    Intolerances        REVIEW OF SYSTEMS:  CONSTITUTIONAL: No fever, weight loss, or fatigue  EYES: No eye pain, visual disturbances, or discharge  ENMT:  No difficulty hearing, tinnitus, vertigo; No sinus or throat pain  NECK: No pain or stiffness  BREASTS: No pain, masses, or nipple discharge  RESPIRATORY: No cough, wheezing, chills or hemoptysis; No shortness of breath  CARDIOVASCULAR: No chest pain, palpitations, dizziness, or leg swelling  GASTROINTESTINAL: difficulty swallowing  GENITOURINARY: No dysuria, frequency, hematuria, or incontinence  NEUROLOGICAL: No headaches,  numbness, or tremors  SKIN: No itching, burning, rashes, or lesions     Vital Signs Last 24 Hrs  T(C): 36.4 (24 Sep 2024 08:28), Max: 37 (23 Sep 2024 20:30)  T(F): 97.6 (24 Sep 2024 08:28), Max: 98.6 (23 Sep 2024 20:30)  HR: 72 (24 Sep 2024 08:28) (60 - 73)  BP: 158/70 (24 Sep 2024 08:28) (110/63 - 161/71)  BP(mean): --  RR: 17 (24 Sep 2024 08:28) (15 - 18)  SpO2: 97% (24 Sep 2024 08:28) (95% - 98%)    Parameters below as of 24 Sep 2024 03:25  Patient On (Oxygen Delivery Method): room air        PHYSICAL EXAM:  GENERAL: NAD, well-groomed, well-developed  HEAD:  Atraumatic, Normocephalic  EYES: EOMI, PERRLA, conjunctiva and sclera clear  ENMT: Moist mucous membranes,   NECK: Supple, No JVD, Normal thyroid  NERVOUS SYSTEM:  Alert non focal  CHEST/LUNG: Clear to percussion bilaterally;   HEART: Regular rate and rhythm;   ABDOMEN: Soft, Nontender, Nondistended; Bowel sounds present  EXTREMITIES:  2+ Peripheral Pulses, No clubbing, cyanosis, or edema  SKIN: No rashes or lesions    LABS:                        13.8   4.76  )-----------( 258      ( 24 Sep 2024 06:00 )             40.1     09-24    136  |  98  |  7   ----------------------------<  85  3.4[L]   |  31  |  0.62    Ca    9.4      24 Sep 2024 06:00  Mg     1.6     09-23    TPro  6.9  /  Alb  3.5  /  TBili  1.0  /  DBili  x   /  AST  20  /  ALT  18  /  AlkPhos  107  09-24    PT/INR - ( 23 Sep 2024 15:20 )   PT: 12.3 sec;   INR: 1.13 ratio         PTT - ( 23 Sep 2024 15:20 )  PTT:29.0 sec  Urinalysis Basic - ( 24 Sep 2024 06:00 )    Color: x / Appearance: x / SG: x / pH: x  Gluc: 85 mg/dL / Ketone: x  / Bili: x / Urobili: x   Blood: x / Protein: x / Nitrite: x   Leuk Esterase: x / RBC: x / WBC x   Sq Epi: x / Non Sq Epi: x / Bacteria: x      CAPILLARY BLOOD GLUCOSE            Urinalysis with Rflx Culture (collected 09-23-24 @ 15:58)        I&O's Summary    23 Sep 2024 07:01  -  24 Sep 2024 07:00  --------------------------------------------------------  IN: 500 mL / OUT: 350 mL / NET: 150 mL        RADIOLOGY & ADDITIONAL TESTS:    Imaging Personally Reviewed:  [ x] YES  [ ] NO    Consultant(s) Notes Reviewed:  [ x] YES  [ ] NO    Care Discussed with Consultants/Other Providers [ x] YES  [ ] NO
[facility]  Jg Veloz PA-C  [address] [place] [address]  [phone]      INTERVAL HPI/OVERNIGHT EVENTS: no acute events; esophagram without obstruction    MEDICATIONS  (STANDING):  enoxaparin Injectable 40 milliGRAM(s) SubCutaneous every 24 hours  fluticasone propionate 50 MICROgram(s)/spray Nasal Spray 1 Spray(s) Both Nostrils every 12 hours  montelukast 10 milliGRAM(s) Oral daily  pantoprazole    Tablet 40 milliGRAM(s) Oral before breakfast  senna 2 Tablet(s) Oral at bedtime  sodium chloride 0.9%. 1000 milliLiter(s) (50 mL/Hr) IV Continuous <Continuous>    MEDICATIONS  (PRN):  acetaminophen     Tablet .. 650 milliGRAM(s) Oral every 6 hours PRN Mild Pain (1 - 3)  ondansetron Injectable 4 milliGRAM(s) IV Push every 6 hours PRN Nausea and/or Vomiting      Allergies    codeine (Unknown)    Intolerances        ROS:   General:  No  fevers, chills, night sweats, fatigue,   Eyes:  Good vision, no reported pain  ENT:  No sore throat, pain, runny nose, dysphagia  CV:  No pain, palpitations, hypo/hypertension  Resp:  No dyspnea, cough, tachypnea, wheezing  GI:  No pain, No nausea, No vomiting, No diarrhea, No constipation, No weight loss, No fever, No pruritis, No rectal bleeding, No tarry stools, No dysphagia,  :  No pain, bleeding, incontinence, nocturia  Muscle:  No pain, weakness  Neuro:  No weakness, tingling, memory problems  Psych:  No fatigue, insomnia, mood problems, depression  Endocrine:  No polyuria, polydipsia, cold/heat intolerance  Heme:  No petechiae, ecchymosis, easy bruisability  Skin:  No rash, tattoos, scars, edema      PHYSICAL EXAM:   Vital Signs:  Vital Signs Last 24 Hrs  T(C): 36.4 (24 Sep 2024 08:28), Max: 37 (23 Sep 2024 20:30)  T(F): 97.6 (24 Sep 2024 08:28), Max: 98.6 (23 Sep 2024 20:30)  HR: 72 (24 Sep 2024 08:28) (60 - 73)  BP: 158/70 (24 Sep 2024 08:28) (110/63 - 161/71)  BP(mean): --  RR: 17 (24 Sep 2024 08:28) (15 - 18)  SpO2: 97% (24 Sep 2024 08:28) (95% - 98%)    Parameters below as of 24 Sep 2024 03:25  Patient On (Oxygen Delivery Method): room air      Daily Height in cm: 160.02 (23 Sep 2024 13:36)    Daily     GENERAL:  Appears stated age,   HEENT:  NC/AT,    CHEST:  Full & symmetric excursion,   HEART:  Regular rhythm,  ABDOMEN:  Soft, non-tender, non-distended,  EXTEREMITIES:  no cyanosis  SKIN:  No rash  NEURO:  Alert,       LABS:                        13.8   4.76  )-----------( 258      ( 24 Sep 2024 06:00 )             40.1     09-24    136  |  98  |  7   ----------------------------<  85  3.4[L]   |  31  |  0.62    Ca    9.4      24 Sep 2024 06:00  Mg     1.6     09-23    TPro  6.9  /  Alb  3.5  /  TBili  1.0  /  DBili  x   /  AST  20  /  ALT  18  /  AlkPhos  107  09-24    PT/INR - ( 23 Sep 2024 15:20 )   PT: 12.3 sec;   INR: 1.13 ratio         PTT - ( 23 Sep 2024 15:20 )  PTT:29.0 sec  Urinalysis Basic - ( 24 Sep 2024 06:00 )    Color: x / Appearance: x / SG: x / pH: x  Gluc: 85 mg/dL / Ketone: x  / Bili: x / Urobili: x   Blood: x / Protein: x / Nitrite: x   Leuk Esterase: x / RBC: x / WBC x   Sq Epi: x / Non Sq Epi: x / Bacteria: x        RADIOLOGY & ADDITIONAL TESTS:  
[facility]  Jg Veloz PA-C  [address] [place] [address]  [phone]      INTERVAL HPI/OVERNIGHT EVENTS: no acute events     MEDICATIONS  (STANDING):  enoxaparin Injectable 40 milliGRAM(s) SubCutaneous every 24 hours  fluticasone propionate 50 MICROgram(s)/spray Nasal Spray 1 Spray(s) Both Nostrils every 12 hours  montelukast 10 milliGRAM(s) Oral daily  pantoprazole    Tablet 40 milliGRAM(s) Oral before breakfast  senna 2 Tablet(s) Oral at bedtime  sodium chloride 0.9%. 1000 milliLiter(s) (50 mL/Hr) IV Continuous <Continuous>    MEDICATIONS  (PRN):  acetaminophen     Tablet .. 650 milliGRAM(s) Oral every 6 hours PRN Mild Pain (1 - 3)  ondansetron Injectable 4 milliGRAM(s) IV Push every 6 hours PRN Nausea and/or Vomiting      Allergies    codeine (Unknown)    Intolerances        ROS:   General:  No  fevers, chills, night sweats, fatigue,   Eyes:  Good vision, no reported pain  ENT:  No sore throat, pain, runny nose, dysphagia  CV:  No pain, palpitations, hypo/hypertension  Resp:  No dyspnea, cough, tachypnea, wheezing  GI:  No pain, No nausea, No vomiting, No diarrhea, No constipation, No weight loss, No fever, No pruritis, No rectal bleeding, No tarry stools, No dysphagia,  :  No pain, bleeding, incontinence, nocturia  Muscle:  No pain, weakness  Neuro:  No weakness, tingling, memory problems  Psych:  No fatigue, insomnia, mood problems, depression  Endocrine:  No polyuria, polydipsia, cold/heat intolerance  Heme:  No petechiae, ecchymosis, easy bruisability  Skin:  No rash, tattoos, scars, edema      PHYSICAL EXAM:   Vital Signs:  Vital Signs Last 24 Hrs  T(C): 36.4 (24 Sep 2024 08:28), Max: 37 (23 Sep 2024 20:30)  T(F): 97.6 (24 Sep 2024 08:28), Max: 98.6 (23 Sep 2024 20:30)  HR: 72 (24 Sep 2024 08:28) (60 - 73)  BP: 158/70 (24 Sep 2024 08:28) (110/63 - 161/71)  BP(mean): --  RR: 17 (24 Sep 2024 08:28) (15 - 18)  SpO2: 97% (24 Sep 2024 08:28) (95% - 98%)    Parameters below as of 24 Sep 2024 03:25  Patient On (Oxygen Delivery Method): room air      Daily Height in cm: 160.02 (23 Sep 2024 13:36)    Daily     GENERAL:  Appears stated age,   HEENT:  NC/AT,    CHEST:  Full & symmetric excursion,   HEART:  Regular rhythm,  ABDOMEN:  Soft, non-tender, non-distended,  EXTEREMITIES:  no cyanosis  SKIN:  No rash  NEURO:  Alert,       LABS:                        13.8   4.76  )-----------( 258      ( 24 Sep 2024 06:00 )             40.1     09-24    136  |  98  |  7   ----------------------------<  85  3.4[L]   |  31  |  0.62    Ca    9.4      24 Sep 2024 06:00  Mg     1.6     09-23    TPro  6.9  /  Alb  3.5  /  TBili  1.0  /  DBili  x   /  AST  20  /  ALT  18  /  AlkPhos  107  09-24    PT/INR - ( 23 Sep 2024 15:20 )   PT: 12.3 sec;   INR: 1.13 ratio         PTT - ( 23 Sep 2024 15:20 )  PTT:29.0 sec  Urinalysis Basic - ( 24 Sep 2024 06:00 )    Color: x / Appearance: x / SG: x / pH: x  Gluc: 85 mg/dL / Ketone: x  / Bili: x / Urobili: x   Blood: x / Protein: x / Nitrite: x   Leuk Esterase: x / RBC: x / WBC x   Sq Epi: x / Non Sq Epi: x / Bacteria: x        RADIOLOGY & ADDITIONAL TESTS:  
Patient is a 89y old  Female who presents with a chief complaint of vomiting, dysphagia (24 Sep 2024 11:09)      INTERVAL HPI/OVERNIGHT EVENTS:overnight events noted, has constipation , no BM for 2 days    Home Medications:  Advair  mcg-21 mcg/inh inhalation aerosol: 2 puff(s) inhaled 2 times a day (19 Jun 2021 15:30)  Flonase 50 mcg/inh nasal spray: 1 spray(s) nasal once a day (19 Jun 2021 15:30)  Linzess 290 mcg oral capsule: 1 cap(s) orally once a day (19 Jun 2021 15:30)  Lipitor 10 mg oral tablet: 1 tab(s) orally once a day (at bedtime) (19 Jun 2021 15:30)  Multiple Vitamins oral tablet: 1 tab(s) orally once a day (21 Jun 2021 11:14)  Norvasc 5 mg oral tablet: 1 tab(s) orally once a day (19 Jun 2021 15:30)  omeprazole 20 mg oral delayed release capsule: 1 cap(s) orally once a day (19 Jun 2021 15:30)  polyethylene glycol 3350 oral powder for reconstitution: 17 gram(s) orally once a day (21 Jun 2021 11:14)  Singulair 10 mg oral tablet: 1 tab(s) orally once a day (19 Jun 2021 15:30)  Tylenol 325 mg oral tablet: 2 tab(s) orally every 6 hours, As Needed for mild pain (21 Jun 2021 11:14)      MEDICATIONS  (STANDING):  bisacodyl Suppository 10 milliGRAM(s) Rectal once  enoxaparin Injectable 40 milliGRAM(s) SubCutaneous every 24 hours  fluticasone propionate 50 MICROgram(s)/spray Nasal Spray 1 Spray(s) Both Nostrils every 12 hours  montelukast 10 milliGRAM(s) Oral daily  pantoprazole    Tablet 40 milliGRAM(s) Oral before breakfast  polyethylene glycol 3350 17 Gram(s) Oral two times a day  potassium chloride    Tablet ER 40 milliEquivalent(s) Oral every 4 hours  senna 2 Tablet(s) Oral at bedtime  sodium chloride 0.9%. 1000 milliLiter(s) (50 mL/Hr) IV Continuous <Continuous>    MEDICATIONS  (PRN):  acetaminophen     Tablet .. 650 milliGRAM(s) Oral every 6 hours PRN Mild Pain (1 - 3)  bisacodyl Suppository 10 milliGRAM(s) Rectal daily PRN Constipation  ondansetron Injectable 4 milliGRAM(s) IV Push every 6 hours PRN Nausea and/or Vomiting      Allergies    codeine (Unknown)    Intolerances        REVIEW OF SYSTEMS:  CONSTITUTIONAL: No fever, weight loss, or fatigue  EYES: No eye pain, visual disturbances, or discharge  ENMT:  No difficulty hearing, tinnitus, vertigo; No sinus or throat pain  NECK: No pain or stiffness  BREASTS: No pain, masses, or nipple discharge  RESPIRATORY: No cough, wheezing, chills or hemoptysis; No shortness of breath  CARDIOVASCULAR: No chest pain, palpitations, dizziness, or leg swelling  GASTROINTESTINAL: No abdominal or epigastric pain. No nausea, vomiting, no BM for 2 days  GENITOURINARY: No dysuria, frequency, hematuria, or incontinence  NEUROLOGICAL: No headaches, , numbness, or tremors  SKIN: No itching, burning, rashes, or lesions     Vital Signs Last 24 Hrs  T(C): 36.4 (25 Sep 2024 08:25), Max: 36.8 (24 Sep 2024 15:49)  T(F): 97.5 (25 Sep 2024 08:25), Max: 98.3 (24 Sep 2024 15:49)  HR: 62 (25 Sep 2024 08:25) (62 - 71)  BP: 159/76 (25 Sep 2024 08:25) (151/83 - 178/82)  BP(mean): --  RR: 17 (25 Sep 2024 08:25) (17 - 18)  SpO2: 93% (25 Sep 2024 08:25) (93% - 95%)    Parameters below as of 24 Sep 2024 23:18  Patient On (Oxygen Delivery Method): room air        PHYSICAL EXAM:  GENERAL: NAD, well-groomed, well-developed  HEAD:  Atraumatic, Normocephalic  EYES: EOMI, PERRLA, conjunctiva and sclera clear  ENMT: Moist mucous membranes,   NECK: Supple, No JVD, Normal thyroid  NERVOUS SYSTEM:  Alert poor memory , poor cognition  CHEST/LUNG: Clear to percussion bilaterally;   HEART: Regular rate and rhythm;  ABDOMEN: Soft, Nontender, Nondistended; Bowel sounds present  EXTREMITIES:  2+ Peripheral Pulses, No clubbing, cyanosis, or edema  SKIN: No rashes or lesions    LABS:                        13.8   6.47  )-----------( 258      ( 25 Sep 2024 06:15 )             40.3     09-25    134[L]  |  97  |  8   ----------------------------<  96  3.2[L]   |  27  |  0.61    Ca    9.4      25 Sep 2024 06:15  Mg     1.6     09-23    TPro  6.9  /  Alb  3.5  /  TBili  1.0  /  DBili  x   /  AST  20  /  ALT  18  /  AlkPhos  107  09-24    PT/INR - ( 23 Sep 2024 15:20 )   PT: 12.3 sec;   INR: 1.13 ratio         PTT - ( 23 Sep 2024 15:20 )  PTT:29.0 sec  Urinalysis Basic - ( 25 Sep 2024 06:15 )    Color: x / Appearance: x / SG: x / pH: x  Gluc: 96 mg/dL / Ketone: x  / Bili: x / Urobili: x   Blood: x / Protein: x / Nitrite: x   Leuk Esterase: x / RBC: x / WBC x   Sq Epi: x / Non Sq Epi: x / Bacteria: x      CAPILLARY BLOOD GLUCOSE            Urinalysis with Rflx Culture (collected 09-23-24 @ 15:58)        I&O's Summary    24 Sep 2024 07:01  -  25 Sep 2024 07:00  --------------------------------------------------------  IN: 600 mL / OUT: 0 mL / NET: 600 mL        RADIOLOGY & ADDITIONAL TESTS:    Imaging Personally Reviewed:  [x ] YES  [ ] NO    Consultant(s) Notes Reviewed:  [x ] YES  [ ] NO    Care Discussed with Consultants/Other Providers [ x] YES  [ ] NO

## 2024-09-25 NOTE — DISCHARGE NOTE PROVIDER - CARE PROVIDER_API CALL
Najma Baxter  Internal Medicine  [address] [place] [address]  Phone: [phone]  Fax: [phone]  Follow Up Time:     Mookie Martin  Gastroenterology  [address] [place] [address]  Phone: [phone]  Fax: [phone]  Follow Up Time:

## 2025-05-29 ENCOUNTER — INPATIENT (INPATIENT)
Facility: HOSPITAL | Age: 89
LOS: 1 days | Discharge: HOME | DRG: 379 | End: 2025-05-31
Attending: INTERNAL MEDICINE | Admitting: INTERNAL MEDICINE
Payer: MEDICARE

## 2025-05-29 VITALS
HEART RATE: 70 BPM | WEIGHT: 139.99 LBS | RESPIRATION RATE: 14 BRPM | DIASTOLIC BLOOD PRESSURE: 80 MMHG | TEMPERATURE: 98 F | SYSTOLIC BLOOD PRESSURE: 134 MMHG | OXYGEN SATURATION: 96 % | HEIGHT: 60 IN

## 2025-05-29 DIAGNOSIS — Z96.659 PRESENCE OF UNSPECIFIED ARTIFICIAL KNEE JOINT: Chronic | ICD-10-CM

## 2025-05-29 DIAGNOSIS — K92.2 GASTROINTESTINAL HEMORRHAGE, UNSPECIFIED: ICD-10-CM

## 2025-05-29 DIAGNOSIS — J45.909 UNSPECIFIED ASTHMA, UNCOMPLICATED: ICD-10-CM

## 2025-05-29 DIAGNOSIS — K62.5 HEMORRHAGE OF ANUS AND RECTUM: ICD-10-CM

## 2025-05-29 DIAGNOSIS — I10 ESSENTIAL (PRIMARY) HYPERTENSION: ICD-10-CM

## 2025-05-29 DIAGNOSIS — D62 ACUTE POSTHEMORRHAGIC ANEMIA: ICD-10-CM

## 2025-05-29 DIAGNOSIS — E78.5 HYPERLIPIDEMIA, UNSPECIFIED: ICD-10-CM

## 2025-05-29 LAB
ALBUMIN SERPL ELPH-MCNC: 3.2 G/DL — LOW (ref 3.3–5)
ALP SERPL-CCNC: 112 U/L — SIGNIFICANT CHANGE UP (ref 30–120)
ALT FLD-CCNC: 17 U/L — SIGNIFICANT CHANGE UP (ref 10–60)
ANION GAP SERPL CALC-SCNC: 6 MMOL/L — SIGNIFICANT CHANGE UP (ref 5–17)
APTT BLD: 27.1 SEC — SIGNIFICANT CHANGE UP (ref 26.1–36.8)
AST SERPL-CCNC: 25 U/L — SIGNIFICANT CHANGE UP (ref 10–40)
BASOPHILS # BLD AUTO: 0.07 K/UL — SIGNIFICANT CHANGE UP (ref 0–0.2)
BASOPHILS # BLD AUTO: 0.08 K/UL — SIGNIFICANT CHANGE UP (ref 0–0.2)
BASOPHILS NFR BLD AUTO: 1.3 % — SIGNIFICANT CHANGE UP (ref 0–2)
BASOPHILS NFR BLD AUTO: 1.4 % — SIGNIFICANT CHANGE UP (ref 0–2)
BILIRUB SERPL-MCNC: 0.5 MG/DL — SIGNIFICANT CHANGE UP (ref 0.2–1.2)
BUN SERPL-MCNC: 16 MG/DL — SIGNIFICANT CHANGE UP (ref 7–23)
CALCIUM SERPL-MCNC: 8.9 MG/DL — SIGNIFICANT CHANGE UP (ref 8.4–10.5)
CHLORIDE SERPL-SCNC: 104 MMOL/L — SIGNIFICANT CHANGE UP (ref 96–108)
CO2 SERPL-SCNC: 30 MMOL/L — SIGNIFICANT CHANGE UP (ref 22–31)
CREAT SERPL-MCNC: 0.52 MG/DL — SIGNIFICANT CHANGE UP (ref 0.5–1.3)
EGFR: 85 ML/MIN/1.73M2 — SIGNIFICANT CHANGE UP
EGFR: 85 ML/MIN/1.73M2 — SIGNIFICANT CHANGE UP
EOSINOPHIL # BLD AUTO: 0.41 K/UL — SIGNIFICANT CHANGE UP (ref 0–0.5)
EOSINOPHIL # BLD AUTO: 0.45 K/UL — SIGNIFICANT CHANGE UP (ref 0–0.5)
EOSINOPHIL NFR BLD AUTO: 7.4 % — HIGH (ref 0–6)
EOSINOPHIL NFR BLD AUTO: 8 % — HIGH (ref 0–6)
GLUCOSE SERPL-MCNC: 92 MG/DL — SIGNIFICANT CHANGE UP (ref 70–99)
HCT VFR BLD CALC: 27.4 % — LOW (ref 34.5–45)
HCT VFR BLD CALC: 28.5 % — LOW (ref 34.5–45)
HCT VFR BLD CALC: 30 % — LOW (ref 34.5–45)
HGB BLD-MCNC: 8.7 G/DL — LOW (ref 11.5–15.5)
HGB BLD-MCNC: 8.9 G/DL — LOW (ref 11.5–15.5)
HGB BLD-MCNC: 9.4 G/DL — LOW (ref 11.5–15.5)
IMM GRANULOCYTES NFR BLD AUTO: 0 % — SIGNIFICANT CHANGE UP (ref 0–0.9)
IMM GRANULOCYTES NFR BLD AUTO: 0.2 % — SIGNIFICANT CHANGE UP (ref 0–0.9)
INR BLD: 1.06 RATIO — SIGNIFICANT CHANGE UP (ref 0.85–1.16)
LYMPHOCYTES # BLD AUTO: 2.1 K/UL — SIGNIFICANT CHANGE UP (ref 1–3.3)
LYMPHOCYTES # BLD AUTO: 2.16 K/UL — SIGNIFICANT CHANGE UP (ref 1–3.3)
LYMPHOCYTES # BLD AUTO: 38.1 % — SIGNIFICANT CHANGE UP (ref 13–44)
LYMPHOCYTES # BLD AUTO: 38.5 % — SIGNIFICANT CHANGE UP (ref 13–44)
MCHC RBC-ENTMCNC: 25.9 PG — LOW (ref 27–34)
MCHC RBC-ENTMCNC: 26.2 PG — LOW (ref 27–34)
MCHC RBC-ENTMCNC: 26.4 PG — LOW (ref 27–34)
MCHC RBC-ENTMCNC: 31.2 G/DL — LOW (ref 32–36)
MCHC RBC-ENTMCNC: 31.3 G/DL — LOW (ref 32–36)
MCHC RBC-ENTMCNC: 31.8 G/DL — LOW (ref 32–36)
MCV RBC AUTO: 82.8 FL — SIGNIFICANT CHANGE UP (ref 80–100)
MCV RBC AUTO: 83 FL — SIGNIFICANT CHANGE UP (ref 80–100)
MCV RBC AUTO: 83.6 FL — SIGNIFICANT CHANGE UP (ref 80–100)
MONOCYTES # BLD AUTO: 0.42 K/UL — SIGNIFICANT CHANGE UP (ref 0–0.9)
MONOCYTES # BLD AUTO: 0.46 K/UL — SIGNIFICANT CHANGE UP (ref 0–0.9)
MONOCYTES NFR BLD AUTO: 7.5 % — SIGNIFICANT CHANGE UP (ref 2–14)
MONOCYTES NFR BLD AUTO: 8.3 % — SIGNIFICANT CHANGE UP (ref 2–14)
NEUTROPHILS # BLD AUTO: 2.47 K/UL — SIGNIFICANT CHANGE UP (ref 1.8–7.4)
NEUTROPHILS # BLD AUTO: 2.49 K/UL — SIGNIFICANT CHANGE UP (ref 1.8–7.4)
NEUTROPHILS NFR BLD AUTO: 44.4 % — SIGNIFICANT CHANGE UP (ref 43–77)
NEUTROPHILS NFR BLD AUTO: 44.9 % — SIGNIFICANT CHANGE UP (ref 43–77)
NRBC BLD AUTO-RTO: 0 /100 WBCS — SIGNIFICANT CHANGE UP (ref 0–0)
PLATELET # BLD AUTO: 231 K/UL — SIGNIFICANT CHANGE UP (ref 150–400)
PLATELET # BLD AUTO: 235 K/UL — SIGNIFICANT CHANGE UP (ref 150–400)
PLATELET # BLD AUTO: 257 K/UL — SIGNIFICANT CHANGE UP (ref 150–400)
POTASSIUM SERPL-MCNC: 4.6 MMOL/L — SIGNIFICANT CHANGE UP (ref 3.5–5.3)
POTASSIUM SERPL-SCNC: 4.6 MMOL/L — SIGNIFICANT CHANGE UP (ref 3.5–5.3)
PROT SERPL-MCNC: 7 G/DL — SIGNIFICANT CHANGE UP (ref 6–8.3)
PROTHROM AB SERPL-ACNC: 12.5 SEC — SIGNIFICANT CHANGE UP (ref 9.9–13.4)
RBC # BLD: 3.3 M/UL — LOW (ref 3.8–5.2)
RBC # BLD: 3.44 M/UL — LOW (ref 3.8–5.2)
RBC # BLD: 3.59 M/UL — LOW (ref 3.8–5.2)
RBC # FLD: 14.6 % — HIGH (ref 10.3–14.5)
RBC # FLD: 14.7 % — HIGH (ref 10.3–14.5)
RBC # FLD: 14.8 % — HIGH (ref 10.3–14.5)
SODIUM SERPL-SCNC: 140 MMOL/L — SIGNIFICANT CHANGE UP (ref 135–145)
WBC # BLD: 5.51 K/UL — SIGNIFICANT CHANGE UP (ref 3.8–10.5)
WBC # BLD: 5.61 K/UL — SIGNIFICANT CHANGE UP (ref 3.8–10.5)
WBC # BLD: 5.89 K/UL — SIGNIFICANT CHANGE UP (ref 3.8–10.5)
WBC # FLD AUTO: 5.51 K/UL — SIGNIFICANT CHANGE UP (ref 3.8–10.5)
WBC # FLD AUTO: 5.61 K/UL — SIGNIFICANT CHANGE UP (ref 3.8–10.5)
WBC # FLD AUTO: 5.89 K/UL — SIGNIFICANT CHANGE UP (ref 3.8–10.5)

## 2025-05-29 PROCEDURE — 99285 EMERGENCY DEPT VISIT HI MDM: CPT | Mod: FS

## 2025-05-29 PROCEDURE — 93010 ELECTROCARDIOGRAM REPORT: CPT

## 2025-05-29 PROCEDURE — 74174 CTA ABD&PLVS W/CONTRAST: CPT | Mod: 26

## 2025-05-29 PROCEDURE — 71045 X-RAY EXAM CHEST 1 VIEW: CPT | Mod: 26

## 2025-05-29 RX ORDER — MONTELUKAST SODIUM 10 MG/1
10 TABLET ORAL DAILY
Refills: 0 | Status: DISCONTINUED | OUTPATIENT
Start: 2025-05-29 | End: 2025-05-31

## 2025-05-29 RX ORDER — ACETAMINOPHEN 500 MG/5ML
650 LIQUID (ML) ORAL EVERY 6 HOURS
Refills: 0 | Status: DISCONTINUED | OUTPATIENT
Start: 2025-05-29 | End: 2025-05-31

## 2025-05-29 RX ADMIN — Medication 40 MILLIGRAM(S): at 14:19

## 2025-05-29 RX ADMIN — Medication 1 DOSE(S): at 20:55

## 2025-05-29 NOTE — ED ADULT NURSE NOTE - OBJECTIVE STATEMENT
pt bib grandson for persistent hemorrhoid bleeding. grandson states pt has severe dementia and has been having increased bleeding in her diapers from her hemorrhoids. Pt was sent by PMD/Eduardo for persistent hemorrhoidal bleeding x 3 days. no other complaints

## 2025-05-29 NOTE — ED ADULT TRIAGE NOTE - NS ED NURSE BANDS TYPE
Kianna Day   YOB: 1976    Date of Visit:  1/29/2018    Chief Complaint   Patient presents with    Other     Pt states her hands swell at times. More in the morning than at night       HPI  Pt c/o bilateral hand swelling x 3 weeks. Pt she can't get her wedding ring on and if she does it is really tight. Pt denies joint pain. Pt states she has been cutting back on salt and drinking a lot of water and nothing has changed. Pt states swelling is a little worse in the morning. Pt states she can get her wedding rings on end of day but they are still tight. Pt states she stopped taking BCP's in December. Review of Systems   Musculoskeletal: Positive for joint swelling. Negative for arthralgias. Neurological: Negative for numbness. Allergies   Allergen Reactions    Amoxicillin Itching    Fluconazole Rash     Outpatient Prescriptions Marked as Taking for the 1/29/18 encounter (Office Visit) with Giancarlo Miller MD   Medication Sig Dispense Refill    Multiple Vitamins-Minerals (HAIR/SKIN/NAILS/BIOTIN PO) Take 2 tablets by mouth daily. Vitals:    01/29/18 1410   BP: 120/80   Site: Right Arm   Position: Sitting   Cuff Size: Medium Adult   Pulse: 75   Resp: 14   Temp: 98.4 °F (36.9 °C)   TempSrc: Oral   SpO2: 99%   Weight: 140 lb 9.6 oz (63.8 kg)   Height: 4' 11\" (1.499 m)     Body mass index is 28.4 kg/m². Physical Exam   Constitutional: She appears well-developed and well-nourished. No distress. Middle aged BF   HENT:   Mouth/Throat: Oropharynx is clear and moist.   Eyes: EOM are normal. Pupils are equal, round, and reactive to light. Musculoskeletal:        Right hand: She exhibits swelling (mild puffiness dorsum of hand). She exhibits no tenderness. Normal strength noted. Left hand: She exhibits swelling (mild puffiness dorsum of hand). She exhibits no tenderness. Normal strength noted. Nursing note reviewed.         11/21/2017  8:38 AM - [facility], Lab [facility]    Component Value Ref Range & Units Status Collected Lab   Glucose 85  65 - 99 mg/dL Final 11/20/2017  8:02 AM [facility]   BUN 16  6 - 24 mg/dL Final 11/20/2017  8:02 AM [facility]   CREATININE 0.84  0.57 - 1.00 mg/dL Final 11/20/2017  8:02 AM [facility]   GFR, Estimated 87  >59 mL/min/1.73 Final 11/20/2017  8:02 AM [facility]   GFR, Estimated 100  >59 mL/min/1.73 Final 11/20/2017  8:02 AM [facility]   BUN/Creatinine Ratio 19  9 - 23 Final 11/20/2017  8:02 AM [facility]   Sodium 141  134 - 144 mmol/L Final 11/20/2017  8:02 AM [facility]   Potassium 4.2  3.5 - 5.2 mmol/L Final 11/20/2017  8:02 AM [facility]   Chloride 105  96 - 106 mmol/L Final 11/20/2017  8:02 AM [facility]   CO2 21  18 - 29 mmol/L Final 11/20/2017  8:02 AM [facility]   Calcium 9.1  8.7 - 10.2 mg/dL Final 11/20/2017  8:02 AM [facility]   Total Protein 6.8  6.0 - 8.5 g/dL Final 11/20/2017  8:02 AM [facility]   Alb 3.9  3.5 - 5.5 g/dL Final 11/20/2017  8:02 AM [facility]   Globulin 2.9  1.5 - 4.5 g/dL Final 11/20/2017  8:02 AM [facility]   Albumin/Globulin Ratio 1.3  1.2 - 2.2 Final 11/20/2017  8:02 AM [facility]   Total Bilirubin 0.2  0.0 - 1.2 mg/dL Final 11/20/2017  8:02 AM [facility]   Alkaline Phosphatase 70  39 - 117 IU/L Final 11/20/2017  8:02 AM [facility]   AST 18  0 - 40 IU/L Final 11/20/2017  8:02 AM [facility]   ALT 11  0 - 32 IU/L Final 11/20/2017  8:02 AM [facility]     11/21/2017  8:38 AM - [facility], Lab [facility]     Component Value Ref Range & Units Status Collected Lab   WBC 6.1  3.4 - 10.8 x10E3/uL Final 11/20/2017  8:02 AM [facility]   RBC 4.04  3.77 - 5.28 x10E6/uL Final 11/20/2017  8:02 AM [facility]   Hemoglobin 11.5  11.1 - 15.9 g/dL Final 11/20/2017  8:02 AM [facility]   Hematocrit 35.8  34.0 - 46.6 % Final 11/20/2017  8:02 AM [facility]   MCV 89  79 - 97 fL Final 11/20/2017  8:02 AM [facility]   MCH 28.5  26.6 - 33.0 pg Final 11/20/2017  8:02 AM [facility]   MCHC 32.1  31.5 - 35.7 g/dL Final 11/20/2017  8:02 AM [facility]   RDW 14.0  12.3 - 15.4 % Final 11/20/2017  8:02 AM [facility]   Platelets 141  702 - 379 x10E3/uL Final 11/20/2017  8:02 AM [facility]   Segs Relative 55  Not Estab. % Final 11/20/2017  8:02 Name band;

## 2025-05-29 NOTE — ED PROVIDER NOTE - RECTAL
nonthrombosed external hemorrhoids, no active bleeding noted, no fissures/HEMORRHOIDS nonthrombosed external hemorrhoids, no active bleeding from external hemorrhoids noted, no fissures/HEMORRHOIDS

## 2025-05-29 NOTE — PATIENT PROFILE ADULT - FALL HARM RISK - HARM RISK INTERVENTIONS
Assistance with ambulation/Assistance OOB with selected safe patient handling equipment/Communicate Risk of Fall with Harm to all staff/Discuss with provider need for PT consult/Monitor for mental status changes/Monitor gait and stability/Move patient closer to nurses' station/Provide patient with walking aids - walker, cane, crutches/Reinforce activity limits and safety measures with patient and family/Reorient to person, place and time as needed/Tailored Fall Risk Interventions/Toileting schedule using arm’s reach rule for commode and bathroom/Use of alarms - bed, chair and/or voice tab/Visual Cue: Yellow wristband and red socks/Bed in lowest position, wheels locked, appropriate side rails in place/Call bell, personal items and telephone in reach/Instruct patient to call for assistance before getting out of bed or chair/Non-slip footwear when patient is out of bed/[place] to call system/Physically safe environment - no spills, clutter or unnecessary equipment/Purposeful Proactive Rounding/Room/bathroom lighting operational, light cord in reach

## 2025-05-29 NOTE — ED ADULT NURSE NOTE - CADM POA PRESS ULCER
No Zonia BARNES:  VITALS: Initial triage and subsequent vitals have been reviewed by me.  GEN APPEARANCE: Alert, cooperative. Non-toxic appearing. Well appearing. NAD.  HEAD: Atraumatic, normocephalic   EYES: PERRLa, EOMI, vision grossly intact.   EARS: Gross hearing intact.   NOSE: No nasal discharge, no external evidence of epistaxis.   NECK: Supple  CV: RRR, S1S2, no c/r/m/g. No cyanosis. Extremities warm, well perfused. Cap refill <2 seconds. No bruits.   LUNGS: CTAB. No wheezing. No rales. No rhonchi. No diminished breath sounds.   ABDOMEN: Soft, NTND. No guarding or rebound. No masses.   MSK/EXT: Spine appears normal, no spine point tenderness. No CVA ttp. Normal muscular development. Pelvis stable. No obvious joint or bony deformity, no peripheral edema.   NEURO: Alert, follows commands. Weight bearing normal. Speech normal. Sensation and motor normal x4 extremities.   SKIN: Normal color for race, warm, dry and intact. No evidence of rash.  PSYCH: Normal mood and affect.

## 2025-05-29 NOTE — H&P ADULT - HISTORY OF PRESENT ILLNESS
· HPI Objective Statement: Hx via [facility] 89 y/o F with hx of dementia, HTN, HLD, asthma, hemorrhoids presents with c/o rectal bleeding x 3 days. As per grandson, pt has hx of hemorrhoids and has had increased rectal bleeding over the past 3 days. States that they spoke to PCP, Dr. Ivory Clark who advised to bring pt to ER for evaluation. Pt is a poor historian secondary to dementia. States that pt is c/o abdominal pain and rectal bleeding with bowel movement. States that pt usually has soft stools. Denies vomiting, fever, use of blood thinners.

## 2025-05-29 NOTE — ED PROVIDER NOTE - CLINICAL SUMMARY MEDICAL DECISION MAKING FREE TEXT BOX
I, Mickey Patton MD, have seen and examined the patient on the date of service.  I agree with the RISHI's assessment as written, with exceptions or additions as noted below or in a separate note.  Patient with a past medical history of dementia, hypertension, hemorrhoids not on any anticoagulation is presenting with concern for bleeding in rectum.  For the past 3 days, family has noted blood on her diaper.  States they noticed when she is wiping.  No other blood otherwise seen in the stool.  Has had this before but given the persistence, they called her primary doctor who advised to come to ED for evaluation.  Patient has otherwise been in her normal state of health.  History obtained from grandson at bedside who was also able to help interpret.  On physical exam patient is well-appearing and in no acute distress.  She is not tachycardic and not hypotensive.  Rectal exam does show some nonbleeding hemorrhoids that appear nonthrombosed.  No active bleeding seen though there is some dried blood seen on her diaper.  Concern for hemorrhoidal bleeding versus less likely acute lower GI bleed.  Will check lab work, imaging and reassess.    Patient with anemia which she has had before though not recently.  CT scan does not show evidence of acute pathology.  Will obtain repeat CBC and reassess. I, Mickey Patton MD, have seen and examined the patient on the date of service.  I agree with the RISHI's assessment as written, with exceptions or additions as noted below or in a separate note.  Patient with a past medical history of dementia, hypertension, hemorrhoids not on any anticoagulation is presenting with concern for bleeding in rectum.  For the past 3 days, family has noted blood on her diaper.  States they noticed when she is wiping.  No other blood otherwise seen in the stool.  Has had this before but given the persistence, they called her primary doctor who advised to come to ED for evaluation.  Patient has otherwise been in her normal state of health.  History obtained from grandson at bedside who was also able to help interpret.  On physical exam patient is well-appearing and in no acute distress.  She is not tachycardic and not hypotensive.  Rectal exam with ed michelle montalvo as chaperone does show some nonbleeding hemorrhoids that appear nonthrombosed.  No active bleeding seen though there is some dried blood seen on her diaper.  Concern for hemorrhoidal bleeding versus less likely acute lower GI bleed.  Will check lab work, imaging and reassess.    Patient with anemia which she has had before though not recently.  CT scan does not show evidence of acute pathology.  Will obtain repeat CBC and reassess.

## 2025-05-29 NOTE — ED ADULT NURSE NOTE - CHPI ED NUR SYMPTOMS POS
Patient here today for sneezing,runny nose, and congestion  ALLERGIES:  Patient has no known allergies.  Medications reviewed and updated.  Social History     Tobacco Use   Smoking Status Not on file   .  Patient would like communication of their results via:        Cell Phone:   Telephone Information:   Mobile [phone]   Mobile [phone]     Okay to leave a message containing results? Yes     GI bleed

## 2025-05-29 NOTE — ED ADULT NURSE NOTE - NURSING GU BLADDER
Notified Amanda and Dr. Ye of pt's PC ratio.  Specimen was a clean catch specimen - Dr. Ye would like specimen recollected via in/out cath.    non-distended

## 2025-05-29 NOTE — ED ADULT NURSE NOTE - NSFALLLASTSIX_ED_ALL_ED
[facility]    Pediatric Gastroenterology Progress Note    Date of Service (when I saw the patient): 2023     Assessment & Plan   Mello Breen is a 66 day old ex 27 +2 week premature infant with IUGR  who I am seeing for cholestasis.     Mello has many risk factors for cholestasis including: prematurity, history of possible infection, PN, history of being NPO, and overall illness.   He does not have signs of choledochal cyst on ultrasound.  Given his age [place] and biliary atresia are unlikely.  Overall his labs are consistent with possible infection.    Depending on overall course will need to consider metabolic disease as possibly contributing to his cholestasis.       Evaluation:  -Depending on overall course may consider cholestasis panel  -Bilirubin should start to improve again now that he is back on feeds     Monitoring:  -Weekly T/D bili, ALT/AST, GGT  -Monitor for acholic stools, if present obtain: T/D bili, ALT/AST, GGT, liver US with doppler and notify GI     Intervention:  -Continue SMOF lipids while on PN  -Restart ursodiol 10 mg/kg bid when getting 20 mL/kg per day of feeds, when off of PN if still cholestatic start MVW  -Advance feeds as tolerated    Rosanna Mcwilliams MD  Pediatric Gastroenterology        Interval History   Having spells and so septic work-up done    Feeds: MBM fortified to 26 kcal/oz     Bili up a little this week    Stool color: yellow/green    Physical Exam   Temp: 98.2  F (36.8  C) Temp src: Axillary BP: 81/53 Pulse: 144   Resp: 53 SpO2: 93 %      Vitals:    03/06/23 0000 03/07/23 0000 03/08/23 0000   Weight: 1.52 kg (3 lb 5.6 oz) 1.54 kg (3 lb 6.3 oz) 1.53 kg (3 lb 6 oz)     Vital Signs with Ranges  Temp:  [98  F (36.7  C)-98.7  F (37.1  C)] 98.2  F (36.8  C)  Pulse:  [120-144] 144  Resp:  [37-59] 53  BP: (73-88)/(44-62) 81/53  FiO2 (%):  [30 %-40 %] 33 %  SpO2:  [90 %-96 %] 93 %  I/O last 3 completed shifts:  In:  197.6 [I.V.:4.42]  Out: 194 [Urine:166; Stool:28]    Gen: Resting comfortably in the isolet laying on side   HEENT: NCAT, ETT in place  Skin: no rashes or lesions on visualized skin, jaundice  Abd: Covered          Medications      starter 5% amino acid in 10% dextrose 0.5 mL/hr at 23 1238       caffeine citrate  10 mg/kg Oral Daily     chlorothiazide  40 mg/kg/day (Dosing Weight) Oral Q12H     cholecalciferol  10 mcg Oral Daily     ferrous sulfate  4 mg/kg/day (Dosing Weight) Oral Daily     gentamicin  4 mg/kg (Dosing Weight) Intravenous Q24H     glycerin  0.25 suppository Rectal BID     heparin lock flush  1 mL Intracatheter Q12H     methylPREDNISolone  2 mg/kg/day (Dosing Weight) Intravenous Q6H     morphine  0.1 mg/kg Oral Q6H     mvw complete formulation  0.3 mL Oral Daily     potassium chloride  2 mEq/kg/day Oral BID     sodium chloride (PF)  0.5 mL Intracatheter Q4H     sodium chloride  8 mEq/kg/day Oral Q6H     ursodiol  10 mg/kg/day (Dosing Weight) Oral BID     vancomycin  20 mg Intravenous Q8H       Data   Labs reviewed in Epic including:  Liver Function Studies:  Recent Labs   Lab Test 23  0551 23  1745 23  0614 23  0345 23  0615 23  0545 23  0640 23  0612 23  0600 23  0515 23  0553 23  0624 23  0356   PROTTOTAL  --   --   --   --   --   --   --   --   --   --   --   --  4.5*   ALBUMIN  --   --   --   --   --   --   --   --   --   --   --   --  1.9*   ALKPHOS  --  1,098*  --  1,085*  --  532*  --  269  --  456*  --    < > 112   AST 70*  --   --   --  94*  --  72* 74* 77*  --  63   < > 101*   ALT 38  --  26  --  42  --  29 46 27  --  20   < > 8   GGT  --   --  97  --   --   --  528* 232* 347*  --  91   < >  --     < > = values in this interval not displayed.       Bilirubin:  Recent Labs   Lab Test 23  0551 23  0614 23  0345 23  0615 23  0545   BILITOTAL 5.6* 4.1* 4.6* 3.8* 3.1*   DBIL  4.37* 3.39* 3.71* 3.11* 2.81*        Unable to determine.

## 2025-05-29 NOTE — ED PROVIDER NOTE - PROGRESS NOTE DETAILS
Spoke to GI, Dr. Jhon Campoverde who agreed with admission and will consult. Spoke to Dr. Bridget Campoverde who accepted admission.

## 2025-05-29 NOTE — H&P ADULT - PROBLEM SELECTOR PLAN 1
pt with brbpr  now with anemia  per family hx of hemorrhoids and mild bleeding - more exacerbated this admission  gi eval  npo for now  ivf  trend cbc  trransfuse prn  cta - non revealing  hold bp meds

## 2025-05-29 NOTE — ED ADULT NURSE NOTE - NSFALLHARMRISKINTERV_ED_ALL_ED

## 2025-05-30 LAB
ANION GAP SERPL CALC-SCNC: 8 MMOL/L — SIGNIFICANT CHANGE UP (ref 5–17)
BUN SERPL-MCNC: 11 MG/DL — SIGNIFICANT CHANGE UP (ref 7–23)
CALCIUM SERPL-MCNC: 8.8 MG/DL — SIGNIFICANT CHANGE UP (ref 8.4–10.5)
CHLORIDE SERPL-SCNC: 105 MMOL/L — SIGNIFICANT CHANGE UP (ref 96–108)
CO2 SERPL-SCNC: 30 MMOL/L — SIGNIFICANT CHANGE UP (ref 22–31)
CREAT SERPL-MCNC: 0.59 MG/DL — SIGNIFICANT CHANGE UP (ref 0.5–1.3)
EGFR: 82 ML/MIN/1.73M2 — SIGNIFICANT CHANGE UP
EGFR: 82 ML/MIN/1.73M2 — SIGNIFICANT CHANGE UP
GLUCOSE SERPL-MCNC: 87 MG/DL — SIGNIFICANT CHANGE UP (ref 70–99)
HCT VFR BLD CALC: 28.6 % — LOW (ref 34.5–45)
HCT VFR BLD CALC: 29.3 % — LOW (ref 34.5–45)
HGB BLD-MCNC: 9.1 G/DL — LOW (ref 11.5–15.5)
HGB BLD-MCNC: 9.3 G/DL — LOW (ref 11.5–15.5)
MCHC RBC-ENTMCNC: 26.4 PG — LOW (ref 27–34)
MCHC RBC-ENTMCNC: 26.5 PG — LOW (ref 27–34)
MCHC RBC-ENTMCNC: 31.7 G/DL — LOW (ref 32–36)
MCHC RBC-ENTMCNC: 31.8 G/DL — LOW (ref 32–36)
MCV RBC AUTO: 83.2 FL — SIGNIFICANT CHANGE UP (ref 80–100)
MCV RBC AUTO: 83.4 FL — SIGNIFICANT CHANGE UP (ref 80–100)
NRBC BLD AUTO-RTO: 0 /100 WBCS — SIGNIFICANT CHANGE UP (ref 0–0)
NRBC BLD AUTO-RTO: 0 /100 WBCS — SIGNIFICANT CHANGE UP (ref 0–0)
PLATELET # BLD AUTO: 247 K/UL — SIGNIFICANT CHANGE UP (ref 150–400)
PLATELET # BLD AUTO: 259 K/UL — SIGNIFICANT CHANGE UP (ref 150–400)
POTASSIUM SERPL-MCNC: 3.9 MMOL/L — SIGNIFICANT CHANGE UP (ref 3.5–5.3)
POTASSIUM SERPL-SCNC: 3.9 MMOL/L — SIGNIFICANT CHANGE UP (ref 3.5–5.3)
RBC # BLD: 3.43 M/UL — LOW (ref 3.8–5.2)
RBC # BLD: 3.52 M/UL — LOW (ref 3.8–5.2)
RBC # FLD: 14.8 % — HIGH (ref 10.3–14.5)
RBC # FLD: 14.9 % — HIGH (ref 10.3–14.5)
SODIUM SERPL-SCNC: 143 MMOL/L — SIGNIFICANT CHANGE UP (ref 135–145)
WBC # BLD: 4.88 K/UL — SIGNIFICANT CHANGE UP (ref 3.8–10.5)
WBC # BLD: 6.26 K/UL — SIGNIFICANT CHANGE UP (ref 3.8–10.5)
WBC # FLD AUTO: 4.88 K/UL — SIGNIFICANT CHANGE UP (ref 3.8–10.5)
WBC # FLD AUTO: 6.26 K/UL — SIGNIFICANT CHANGE UP (ref 3.8–10.5)

## 2025-05-30 RX ORDER — MELATONIN 5 MG
3 TABLET ORAL ONCE
Refills: 0 | Status: COMPLETED | OUTPATIENT
Start: 2025-05-30 | End: 2025-05-30

## 2025-05-30 RX ADMIN — Medication 3 MILLIGRAM(S): at 23:19

## 2025-05-30 RX ADMIN — MONTELUKAST SODIUM 10 MILLIGRAM(S): 10 TABLET ORAL at 13:44

## 2025-05-30 RX ADMIN — Medication 1 DOSE(S): at 05:33

## 2025-05-30 RX ADMIN — Medication 1 DOSE(S): at 18:37

## 2025-05-30 RX ADMIN — Medication 40 MILLIGRAM(S): at 05:33

## 2025-05-30 NOTE — CARE COORDINATION ASSESSMENT. - NSCAREPROVIDERS_GEN_ALL_CORE_FT
CARE PROVIDERS:  Accepting Physician: Bridget Campoverde  Admitting: Bridget Campoverde  Attending: Bridget Camopverde  Case Management: Santaromana, Anna  Consultant: Mookie Martin  Covering Team: Bridget Campoverde  ED ACP: Sharonda Romo ED Attending: Mickey Patton ED Nurse: Sahil Martinez  Nurse: Chantell Delong  Nurse: Tita Moffett  Nurse: Bhumi Ponce  Nurse: Katya Gonzalez  Ordered: Physician, Ordering  Ordered: Lorie, [facility]  Outpatient Provider: Seth Aponte  Outpatient Provider: Lars Aguilar  PCA/Nursing Assistant: Gretel Winchester  Physical Therapy: Rosalino Marrufo  Primary Team: Jay Petit  Quality Review: Jennyfer Blevins  Registered Dietitian: Bhakti Johnson  Respiratory Therapy: Alicia Perez

## 2025-05-30 NOTE — CONSULT NOTE ADULT - ASSESSMENT
Rectal bleeding  Normocytic anemia  Hemorrhoids    Recommendations:  - HIgh fiber diet  - PO Hydration  - miralax 17g QD  - Stool softener daily  - Trend CBC's.   - Likely hemorrhoid related, Hb currently uptrending. Manage conservatively for now. Discussed with daughter at bedside who agrees with plan  - Will follow with you    Mookie Martin M.D.  [facility]  [phone]    Advanced care planning was discussed with patient and family.  Advanced care planning forms were reviewed and discussed.  Risks, benefits and alternatives of gastroenterologic procedures were discussed in detail and all questions were answered  60 minutes spent on total encounter of which more than fifty percent of the encounter was spent counseling and/or coordinating care by the attending physician.

## 2025-05-30 NOTE — PATIENT CHOICE NOTE. - NSPTCHOICENOTES_GEN_ALL_CORE
TRANSITION OF CARE PLAN:  Patient unable to self-direct own DC planning; daughter Hayde Saavedra [phone] -primary contact & decision maker; DC to home with [facility] or [facility];  resume [facility] / CDPAP services by calling [facility] - [phone] ext [phone]; Fax DC Summary prior to DC- [phone]; As per Lucrecia, family will be able to assume care IF patient is DC'd to home over the weekend; Family will transport at DC;

## 2025-05-30 NOTE — CARE COORDINATION ASSESSMENT. - NSADDITIONAL INFORMATION_FT
RN CCM met with patient and daughter Hayde Saavedra [phone] for assessment and DC planning at bedside; RN CCM role explained; Patient A&Ox0, unable to participate with assessment; r/t pmhx : dementia; patient walks with RW; requires constant supervision; has [facility]/ CDPAP services; 8hrs a day x7 days a week;   has RW; Commode; shower chair; 0STE to enter the home 0STE to bedroom;   TRANSITION OF CARE PLAN:  Patient unable to self-direct own DC planning; daughter Hayde Saavedra [phone] -primary contact & decision maker; DC to home with [facility] or [facility];  resume [facility] / CDPAP services by calling [facility] - [phone] ext [phone]; Fax DC Summary prior to DC- [phone]; As per Lucrecia, family will be able to assume care IF patient is DC'd to home over the weekend; Family will transport at DC;

## 2025-05-30 NOTE — PHYSICAL THERAPY INITIAL EVALUATION ADULT - PERTINENT HX OF CURRENT PROBLEM, REHAB EVAL
HPI Objective Statement: Hx via Thai 89 y/o F with hx of dementia, HTN, HLD, asthma, hemorrhoids presents with c/o rectal bleeding x 3 days. As per grandson, pt has hx of hemorrhoids and has had increased rectal bleeding over the past 3 days. States that they spoke to PCP, Dr. Ivory Clark who advised to bring pt to ER for evaluation. Pt is a poor historian secondary to dementia. States that pt is c/o abdominal pain and rectal bleeding with bowel movement. States that pt usually has soft stools. Denies vomiting, fever, use of blood thinners.

## 2025-05-30 NOTE — PHYSICAL THERAPY INITIAL EVALUATION ADULT - ADDITIONAL COMMENTS
pvt home with 1 FELIBERTO w/HR and 1 steps inside w/HR. Pt has daughters and HHA 3x/week for 8 hours to ast pt.

## 2025-05-30 NOTE — CONSULT NOTE ADULT - SUBJECTIVE AND OBJECTIVE BOX
[facility]    Ke Mary Cheney NP    [address] [place] [address] [phone]      Chief Complaint:  Patient is a 89y old  Female who presents with a chief complaint of rectal bleeding (29 May 2025 20:33)      HPI:  89 y/o F with hx of dementia, HTN, HLD, asthma, hemorrhoids presents with c/o rectal bleeding x 3 days. As per grandson, pt has hx of hemorrhoids and has had increased rectal bleeding over the past 3 days. States that they spoke to PCP, Dr. Ivory Clark who advised to bring pt to ER for evaluation. Pt is a poor historian secondary to dementia. States that pt is c/o abdominal pain and rectal bleeding with bowel movement. States that pt usually has soft stools. Denies vomiting, fever, use of blood thinners.    Allergies:  codeine (Unknown)      Medications:  acetaminophen     Tablet .. 650 milliGRAM(s) Oral every 6 hours PRN  fluticasone propionate/ salmeterol 250-50 MICROgram(s) Diskus 1 Dose(s) Inhalation two times a day  montelukast 10 milliGRAM(s) Oral daily  pantoprazole    Tablet 40 milliGRAM(s) Oral before breakfast  sodium chloride 0.9%. 1000 milliLiter(s) IV Continuous <Continuous>      PMHX/PSHX:  Asthma    HTN (hypertension)    Dyslipidemia    S/P knee replacement        Family history:  No pertinent family history in first degree relatives        Social History:     ROS:     General:  No wt loss, fevers, chills, night sweats, fatigue,   Eyes:  Good vision, no reported pain  ENT:  No sore throat, pain, runny nose, dysphagia  CV:  No pain, palpitations, hypo/hypertension  Resp:  No dyspnea, cough, tachypnea, wheezing  GI:  No pain, No nausea, No vomiting, No diarrhea, No constipation, No weight loss, No fever, No pruritis, No rectal bleeding, No tarry stools, No dysphagia,  :  No pain, bleeding, incontinence, nocturia  Muscle:  No pain, weakness  Neuro:  No weakness, tingling, memory problems  Psych:  No fatigue, insomnia, mood problems, depression  Endocrine:  No polyuria, polydipsia, cold/heat intolerance  Heme:  No petechiae, ecchymosis, easy bruisability  Skin:  No rash, tattoos, scars, edema      PHYSICAL EXAM:   Vital Signs:  Vital Signs Last 24 Hrs  T(C): 36.8 (30 May 2025 08:36), Max: 36.8 (29 May 2025 13:47)  T(F): 98.3 (30 May 2025 08:36), Max: 98.3 (30 May 2025 08:36)  HR: 72 (30 May 2025 08:36) (61 - 72)  BP: 115/67 (30 May 2025 08:36) (111/67 - 134/80)  BP(mean): --  RR: 18 (30 May 2025 08:36) (14 - 18)  SpO2: 94% (30 May 2025 08:36) (94% - 97%)    Parameters below as of 29 May 2025 23:00  Patient On (Oxygen Delivery Method): room air      Daily Height in cm: 152.4 (29 May 2025 13:47)    Daily     GENERAL:  Appears stated age, well-groomed, well-nourished, no distress  HEENT:  NC/AT,  conjunctivae clear and pink, no thyromegaly, nodules, adenopathy, no JVD, sclera -anicteric  CHEST:  Full & symmetric excursion, no increased effort, breath sounds clear  HEART:  Regular rhythm, S1, S2, no murmur/rub/S3/S4, no abdominal bruit, no edema  ABDOMEN:  Soft, non-tender, non-distended, normoactive bowel sounds,  no masses ,no hepato-splenomegaly, no signs of chronic liver disease  EXTEREMITIES:  no cyanosis,clubbing or edema  SKIN:  No rash/erythema/ecchymoses/petechiae/wounds/abscess/warm/dry  NEURO:  Alert, oriented, no asterixis, no tremor, no encephalopathy    LABS:                        9.1    4.88  )-----------( 247      ( 30 May 2025 06:00 )             28.6     05-30    143  |  105  |  11  ----------------------------<  87  3.9   |  30  |  0.59    Ca    8.8      30 May 2025 06:00    TPro  7.0  /  Alb  3.2[L]  /  TBili  0.5  /  DBili  x   /  AST  25  /  ALT  17  /  AlkPhos  112  05-29    LIVER FUNCTIONS - ( 29 May 2025 13:57 )  Alb: 3.2 g/dL / Pro: 7.0 g/dL / ALK PHOS: 112 U/L / ALT: 17 U/L / AST: 25 U/L / GGT: x           PT/INR - ( 29 May 2025 13:57 )   PT: 12.5 sec;   INR: 1.06 ratio         PTT - ( 29 May 2025 13:57 )  PTT:27.1 sec  Urinalysis Basic - ( 30 May 2025 06:00 )    Color: x / Appearance: x / SG: x / pH: x  Gluc: 87 mg/dL / Ketone: x  / Bili: x / Urobili: x   Blood: x / Protein: x / Nitrite: x   Leuk Esterase: x / RBC: x / WBC x   Sq Epi: x / Non Sq Epi: x / Bacteria: x          Imaging:

## 2025-05-30 NOTE — CAREGIVER ENGAGEMENT NOTE - CAREGIVER OUTREACH NOTES - FREE TEXT
TRANSITION OF CARE PLAN:  Patient unable to self-direct own DC planning; daughter Hayde Saavedra [phone] -primary contact & decision maker; DC to home with [facility] or [facility];  resume [facility] / CDPAP services by calling [facility] - [phone] ext [phone]; Fax DC Summary prior to DC- [phone]; As per Lucrecia, family will be able to assume care IF patient is DC'd to home over the weekend; Family will transport at DC;

## 2025-05-31 ENCOUNTER — TRANSCRIPTION ENCOUNTER (OUTPATIENT)
Age: 89
End: 2025-05-31

## 2025-05-31 VITALS
TEMPERATURE: 97 F | HEART RATE: 64 BPM | SYSTOLIC BLOOD PRESSURE: 138 MMHG | OXYGEN SATURATION: 97 % | RESPIRATION RATE: 20 BRPM | DIASTOLIC BLOOD PRESSURE: 77 MMHG

## 2025-05-31 LAB
ANION GAP SERPL CALC-SCNC: 8 MMOL/L — SIGNIFICANT CHANGE UP (ref 5–17)
BUN SERPL-MCNC: 13 MG/DL — SIGNIFICANT CHANGE UP (ref 7–23)
CALCIUM SERPL-MCNC: 9.1 MG/DL — SIGNIFICANT CHANGE UP (ref 8.4–10.5)
CHLORIDE SERPL-SCNC: 105 MMOL/L — SIGNIFICANT CHANGE UP (ref 96–108)
CO2 SERPL-SCNC: 29 MMOL/L — SIGNIFICANT CHANGE UP (ref 22–31)
CREAT SERPL-MCNC: 0.62 MG/DL — SIGNIFICANT CHANGE UP (ref 0.5–1.3)
EGFR: 81 ML/MIN/1.73M2 — SIGNIFICANT CHANGE UP
EGFR: 81 ML/MIN/1.73M2 — SIGNIFICANT CHANGE UP
GLUCOSE SERPL-MCNC: 99 MG/DL — SIGNIFICANT CHANGE UP (ref 70–99)
HCT VFR BLD CALC: 26.1 % — LOW (ref 34.5–45)
HGB BLD-MCNC: 8.4 G/DL — LOW (ref 11.5–15.5)
MCHC RBC-ENTMCNC: 26.7 PG — LOW (ref 27–34)
MCHC RBC-ENTMCNC: 32.2 G/DL — SIGNIFICANT CHANGE UP (ref 32–36)
MCV RBC AUTO: 82.9 FL — SIGNIFICANT CHANGE UP (ref 80–100)
NRBC BLD AUTO-RTO: 0 /100 WBCS — SIGNIFICANT CHANGE UP (ref 0–0)
PLATELET # BLD AUTO: 222 K/UL — SIGNIFICANT CHANGE UP (ref 150–400)
POTASSIUM SERPL-MCNC: 4 MMOL/L — SIGNIFICANT CHANGE UP (ref 3.5–5.3)
POTASSIUM SERPL-SCNC: 4 MMOL/L — SIGNIFICANT CHANGE UP (ref 3.5–5.3)
RBC # BLD: 3.15 M/UL — LOW (ref 3.8–5.2)
RBC # FLD: 15 % — HIGH (ref 10.3–14.5)
SODIUM SERPL-SCNC: 142 MMOL/L — SIGNIFICANT CHANGE UP (ref 135–145)
WBC # BLD: 5.7 K/UL — SIGNIFICANT CHANGE UP (ref 3.8–10.5)
WBC # FLD AUTO: 5.7 K/UL — SIGNIFICANT CHANGE UP (ref 3.8–10.5)

## 2025-05-31 PROCEDURE — 36415 COLL VENOUS BLD VENIPUNCTURE: CPT

## 2025-05-31 PROCEDURE — 80048 BASIC METABOLIC PNL TOTAL CA: CPT

## 2025-05-31 PROCEDURE — 86850 RBC ANTIBODY SCREEN: CPT

## 2025-05-31 PROCEDURE — 99285 EMERGENCY DEPT VISIT HI MDM: CPT

## 2025-05-31 PROCEDURE — 85610 PROTHROMBIN TIME: CPT

## 2025-05-31 PROCEDURE — 86901 BLOOD TYPING SEROLOGIC RH(D): CPT

## 2025-05-31 PROCEDURE — 94640 AIRWAY INHALATION TREATMENT: CPT

## 2025-05-31 PROCEDURE — 86900 BLOOD TYPING SEROLOGIC ABO: CPT

## 2025-05-31 PROCEDURE — 93005 ELECTROCARDIOGRAM TRACING: CPT

## 2025-05-31 PROCEDURE — 85025 COMPLETE CBC W/AUTO DIFF WBC: CPT

## 2025-05-31 PROCEDURE — 74174 CTA ABD&PLVS W/CONTRAST: CPT

## 2025-05-31 PROCEDURE — 85027 COMPLETE CBC AUTOMATED: CPT

## 2025-05-31 PROCEDURE — 97161 PT EVAL LOW COMPLEX 20 MIN: CPT

## 2025-05-31 PROCEDURE — 71045 X-RAY EXAM CHEST 1 VIEW: CPT

## 2025-05-31 PROCEDURE — 85730 THROMBOPLASTIN TIME PARTIAL: CPT

## 2025-05-31 PROCEDURE — 96374 THER/PROPH/DIAG INJ IV PUSH: CPT

## 2025-05-31 PROCEDURE — 80053 COMPREHEN METABOLIC PANEL: CPT

## 2025-05-31 RX ORDER — HYDROCORTISONE 10 MG/G
1 CREAM TOPICAL
Qty: 0 | Refills: 0 | DISCHARGE

## 2025-05-31 RX ADMIN — Medication 40 MILLIGRAM(S): at 05:14

## 2025-05-31 RX ADMIN — Medication 1 DOSE(S): at 05:12

## 2025-05-31 NOTE — DISCHARGE NOTE PROVIDER - HOSPITAL COURSE
· HPI Objective Statement: Hx via Telugu 89 y/o F with hx of dementia, HTN, HLD, asthma, hemorrhoids presents with c/o rectal bleeding x 3 days. As per grandson, pt has hx of hemorrhoids and has had increased rectal bleeding over the past 3 days. States that they spoke to PCP, Dr. Ivory Clark who advised to bring pt to ER for evaluation. Pt is a poor historian secondary to dementia. States that pt is c/o abdominal pain and rectal bleeding with bowel movement. States that pt usually has soft stools. Denies vomiting, fever, use of blood thinners. Pt admitted for rectal bleed and was noted to have large hemorrhoids. She does have history of external hemorrhoids and has seen colorectal surgery. She was seen by gi inhouse. Her h/h remains stable and her diet was advanced without difficulty. She is stable and family request to take patient home. She is advised to follow up with colorectal surgery as outpt.    >35 minutes spent on discharge

## 2025-05-31 NOTE — PROGRESS NOTE ADULT - SUBJECTIVE AND OBJECTIVE BOX
Date of Service: 05-31-25 @ 10:33    Patient is a 89y old  Female who presents with a chief complaint of rectal bleeding (30 May 2025 12:37)       INTERVAL HPI/OVERNIGHT EVENTS: stable, had bm with some blood - visible hemorrhoids, family at bedside - request to take pt home today    MEDICATIONS  (STANDING):  fluticasone propionate/ salmeterol 250-50 MICROgram(s) Diskus 1 Dose(s) Inhalation two times a day  montelukast 10 milliGRAM(s) Oral daily  pantoprazole    Tablet 40 milliGRAM(s) Oral before breakfast    MEDICATIONS  (PRN):  acetaminophen     Tablet .. 650 milliGRAM(s) Oral every 6 hours PRN Temp greater or equal to 38C (100.4F), Moderate Pain (4 - 6)      Allergies    codeine (Unknown)    Intolerances        REVIEW OF SYSTEMS:  CONSTITUTIONAL: No fever, weight loss, or fatigue  EYES: No eye pain, visual disturbances  ENMT:  No difficulty hearing, tinnitus, vertigo; No sinus or throat pain  NECK: No pain or stiffness  RESPIRATORY: No cough, wheezing, chills or hemoptysis; No shortness of breath  CARDIOVASCULAR: No chest pain, palpitations, dizziness  GASTROINTESTINAL:no abd pain, slight blood in toilet bowel after bm  GENITOURINARY: No dysuria, frequency, hematuria, or incontinence  NEUROLOGICAL: No headaches, memory loss, loss of strength, numbness, or tremors  SKIN: No itching, burning  LYMPH NODES: No enlarged glands  MUSCULOSKELETAL: No joint pain or swelling; No muscle, back, or extremity pain  PSYCHIATRIC: No depression, mood swings  HEME/LYMPH: No easy bruising, or bleeding gums  ALLERGY AND IMMUNOLOGIC: No hives    Vital Signs Last 24 Hrs  T(C): 36.3 (31 May 2025 07:42), Max: 37.3 (30 May 2025 15:20)  T(F): 97.3 (31 May 2025 07:42), Max: 99.2 (30 May 2025 15:20)  HR: 64 (31 May 2025 07:42) (64 - 77)  BP: 138/77 (31 May 2025 07:42) (99/64 - 138/77)  BP(mean): --  RR: 20 (31 May 2025 07:42) (18 - 20)  SpO2: 97% (31 May 2025 07:42) (94% - 97%)    Parameters below as of 31 May 2025 07:42  Patient On (Oxygen Delivery Method): room air        PHYSICAL EXAM:  GENERAL: NAD, well-groomed, well-developed  HEAD:  Atraumatic, Normocephalic  EYES: EOMI, PERRLA, conjunctiva and sclera clear  ENMT: No tonsillar erythema, exudates, or enlargement   NECK: Supple, No JVD  NERVOUS SYSTEM:  Alert & awake and confused  CHEST/LUNG: Clear to auscultation bilaterally; No rales, rhonchi, wheezing  HEART: Regular rate and rhythm  ABDOMEN: Soft, Nontender, Nondistended; Bowel sounds present  EXTREMITIES:  2+ Peripheral Pulses   LYMPH: No lymphadenopathy noted  SKIN: No rashes     LABS:                        8.4    5.70  )-----------( 222      ( 31 May 2025 06:00 )             26.1     31 May 2025 06:00    142    |  105    |  13     ----------------------------<  99     4.0     |  29     |  0.62     Ca    9.1        31 May 2025 06:00      PT/INR - ( 29 May 2025 13:57 )   PT: 12.5 sec;   INR: 1.06 ratio         PTT - ( 29 May 2025 13:57 )  PTT:27.1 sec  Urinalysis Basic - ( 31 May 2025 06:00 )    Color: x / Appearance: x / SG: x / pH: x  Gluc: 99 mg/dL / Ketone: x  / Bili: x / Urobili: x   Blood: x / Protein: x / Nitrite: x   Leuk Esterase: x / RBC: x / WBC x   Sq Epi: x / Non Sq Epi: x / Bacteria: x      CAPILLARY BLOOD GLUCOSE                RADIOLOGY & ADDITIONAL TESTS:      Consultant(s) Notes Reviewed:  [ x] YES  [ ] NO    Care Discussed with Consultants/Other Providers [ x] YES  [ ] NO    Advanced care planning discussed with patient and family, advanced care planning forms reviewed, discussed, and completed.  20 minutes spent.  
  Date of Service: 05-30-25 @ 12:38    Patient is a 89y old  Female who presents with a chief complaint of rectal bleeding (30 May 2025 10:58)       INTERVAL HPI/OVERNIGHT EVENTS: daughter at bedside, no more bleeding reported    MEDICATIONS  (STANDING):  fluticasone propionate/ salmeterol 250-50 MICROgram(s) Diskus 1 Dose(s) Inhalation two times a day  montelukast 10 milliGRAM(s) Oral daily  pantoprazole    Tablet 40 milliGRAM(s) Oral before breakfast  sodium chloride 0.9%. 1000 milliLiter(s) (50 mL/Hr) IV Continuous <Continuous>    MEDICATIONS  (PRN):  acetaminophen     Tablet .. 650 milliGRAM(s) Oral every 6 hours PRN Temp greater or equal to 38C (100.4F), Moderate Pain (4 - 6)      Allergies    codeine (Unknown)    Intolerances        REVIEW OF SYSTEMS:  CONSTITUTIONAL: No fever, weight loss, or fatigue  EYES: No eye pain, visual disturbances  ENMT:  No difficulty hearing, tinnitus, vertigo; No sinus or throat pain  NECK: No pain or stiffness  RESPIRATORY: No cough, wheezing, chills or hemoptysis; No shortness of breath  CARDIOVASCULAR: No chest pain, palpitations, dizziness  GASTROINTESTINAL: No abdominal or epigastric pain. No nausea, vomiting, or hematemesis; No diarrhea or constipation. No melena or hematochezia.  GENITOURINARY: No dysuria, frequency, hematuria, or incontinence  NEUROLOGICAL: No headaches, memory loss, loss of strength, numbness, or tremors  SKIN: No itching, burning  LYMPH NODES: No enlarged glands  MUSCULOSKELETAL: No joint pain or swelling; No muscle, back, or extremity pain  PSYCHIATRIC: No depression, mood swings  HEME/LYMPH: No easy bruising, or bleeding gums  ALLERGY AND IMMUNOLOGIC: No hives    Vital Signs Last 24 Hrs  T(C): 36.8 (30 May 2025 08:36), Max: 36.8 (29 May 2025 13:47)  T(F): 98.3 (30 May 2025 08:36), Max: 98.3 (30 May 2025 08:36)  HR: 72 (30 May 2025 08:36) (61 - 72)  BP: 115/67 (30 May 2025 08:36) (111/67 - 134/80)  BP(mean): --  RR: 18 (30 May 2025 08:36) (14 - 18)  SpO2: 94% (30 May 2025 08:36) (94% - 97%)    Parameters below as of 29 May 2025 23:00  Patient On (Oxygen Delivery Method): room air        PHYSICAL EXAM:  GENERAL: NAD, well-groomed, well-developed  HEAD:  Atraumatic, Normocephalic  EYES: EOMI, PERRLA, conjunctiva and sclera clear  ENMT: No tonsillar erythema, exudates, or enlargement   NECK: Supple, No JVD  NERVOUS SYSTEM:  Alert & awake and confused  CHEST/LUNG: Clear to auscultation bilaterally; No rales, rhonchi, wheezing  HEART: Regular rate and rhythm  ABDOMEN: Soft, Nontender, Nondistended; Bowel sounds present  EXTREMITIES:  2+ Peripheral Pulses   LYMPH: No lymphadenopathy noted  SKIN: No rashes     LABS:                        9.1    4.88  )-----------( 247      ( 30 May 2025 06:00 )             28.6     30 May 2025 06:00    143    |  105    |  11     ----------------------------<  87     3.9     |  30     |  0.59     Ca    8.8        30 May 2025 06:00    TPro  7.0    /  Alb  3.2    /  TBili  0.5    /  DBili  x      /  AST  25     /  ALT  17     /  AlkPhos  112    29 May 2025 13:57    PT/INR - ( 29 May 2025 13:57 )   PT: 12.5 sec;   INR: 1.06 ratio         PTT - ( 29 May 2025 13:57 )  PTT:27.1 sec  Urinalysis Basic - ( 30 May 2025 06:00 )    Color: x / Appearance: x / SG: x / pH: x  Gluc: 87 mg/dL / Ketone: x  / Bili: x / Urobili: x   Blood: x / Protein: x / Nitrite: x   Leuk Esterase: x / RBC: x / WBC x   Sq Epi: x / Non Sq Epi: x / Bacteria: x      CAPILLARY BLOOD GLUCOSE                RADIOLOGY & ADDITIONAL TESTS:      Consultant(s) Notes Reviewed:  [x ] YES  [ ] NO    Care Discussed with Consultants/Other Providers [x ] YES  [ ] NO    Advanced care planning discussed with patient and family, advanced care planning forms reviewed, discussed, and completed.  20 minutes spent.

## 2025-05-31 NOTE — DISCHARGE NOTE PROVIDER - NSDCCPCAREPLAN_GEN_ALL_CORE_FT
PRINCIPAL DISCHARGE DIAGNOSIS  Diagnosis: Rectal bleeding  Assessment and Plan of Treatment: due to large exteral hemorrhoids  increase fiber  bowel regimen  anusol topical cream  fu with colorectal surgery      SECONDARY DISCHARGE DIAGNOSES  Diagnosis: Anemia  Assessment and Plan of Treatment:

## 2025-05-31 NOTE — DISCHARGE NOTE NURSING/CASE MANAGEMENT/SOCIAL WORK - PATIENT PORTAL LINK FT
You can access the FollowMyHealth Patient Portal offered by [facility] by registering at the following website: [email or web address]. By joining [facility]’s FollowMyHealth portal, you will also be able to view your health information using other applications (apps) compatible with our system.

## 2025-05-31 NOTE — PROGRESS NOTE ADULT - PROBLEM SELECTOR PLAN 1
pt with brb[facility]  now with anemia- stable cbc today  per family hx of hemorrhoids and mild bleeding - more exacerbated this admission  gi eval noted  advance diet   trend cbc  trransfuse prn  cta - non revealing  hold bp meds
due to hemorrhoids  stable h/h  outpt colorectal fu  family would like to take pt home  bowel regimen  topical hemorrhoidal creams

## 2025-05-31 NOTE — PROGRESS NOTE ADULT - PROBLEM SELECTOR PLAN 2
as above  trend cbc  likely from hemorroidal bleed  gi eval noted - outpt colorectal fu  transfuse prn
as above  trend cbc  likely from hemorroidal bleed  gi eval  transfuse prn

## 2025-05-31 NOTE — DISCHARGE NOTE NURSING/CASE MANAGEMENT/SOCIAL WORK - FINANCIAL ASSISTANCE
[facility] provides services at a reduced cost to those who are determined to be eligible through [facility]’s financial assistance program. Information regarding [facility]’s financial assistance program can be found by going to [email or web address] or by calling [phone].

## 2025-05-31 NOTE — DISCHARGE NOTE PROVIDER - NSDCQMAMI_CARD_ALL_CORE
-- Message is from the [facility]--    Reason for Call: Patient calling back per missed call. Please follow up as soon as possible.     Caller Information       Type Contact Phone    10/10/2019 11:34 AM Phone (Incoming) Brady Crain (Self) [phone] (M)          Alternative phone number: None    Turnaround time given to caller:   \"This message will be sent to [state Provider's name]. The clinical team will fulfill your request as soon as they review your message when the office opens tomorrow.\"     No